# Patient Record
Sex: FEMALE | Race: WHITE | Employment: FULL TIME | URBAN - METROPOLITAN AREA
[De-identification: names, ages, dates, MRNs, and addresses within clinical notes are randomized per-mention and may not be internally consistent; named-entity substitution may affect disease eponyms.]

---

## 2018-07-03 ENCOUNTER — HOSPITAL ENCOUNTER (OUTPATIENT)
Age: 43
Discharge: HOME | End: 2018-07-05
Payer: COMMERCIAL

## 2018-07-03 PROCEDURE — G0123 SCREEN CERV/VAG THIN LAYER: HCPCS

## 2018-07-03 PROCEDURE — 87624 HPV HI-RISK TYP POOLED RSLT: CPT

## 2018-07-12 LAB
CORRESPONDING PAP CASE #: NORMAL
HPV, HIGH RISK: POSITIVE

## 2019-01-17 ENCOUNTER — HOSPITAL ENCOUNTER (OUTPATIENT)
Age: 44
Discharge: HOME | End: 2019-01-19
Payer: COMMERCIAL

## 2019-01-17 PROCEDURE — G0123 SCREEN CERV/VAG THIN LAYER: HCPCS

## 2019-08-06 ENCOUNTER — HOSPITAL ENCOUNTER (OUTPATIENT)
Age: 44
Discharge: HOME | End: 2019-08-08
Payer: COMMERCIAL

## 2019-08-06 PROCEDURE — G0123 SCREEN CERV/VAG THIN LAYER: HCPCS

## 2020-03-23 ENCOUNTER — TELEPHONE (OUTPATIENT)
Dept: PRIMARY CARE CLINIC | Age: 45
End: 2020-03-23

## 2020-03-23 NOTE — TELEPHONE ENCOUNTER
Called patient to advise I would need to cancel her apt tomorrow. She wanted to come in prior to June due to constipation. Spoke with Dr and called patient to advised that she could take Miralax daily to help with this, patient did not answer .

## 2020-06-24 ENCOUNTER — HOSPITAL ENCOUNTER (OUTPATIENT)
Age: 45
Discharge: HOME | End: 2020-06-26
Payer: COMMERCIAL

## 2020-06-24 ENCOUNTER — OFFICE VISIT (OUTPATIENT)
Dept: PRIMARY CARE CLINIC | Age: 45
End: 2020-06-24
Payer: COMMERCIAL

## 2020-06-24 VITALS
HEART RATE: 70 BPM | DIASTOLIC BLOOD PRESSURE: 78 MMHG | SYSTOLIC BLOOD PRESSURE: 112 MMHG | HEIGHT: 68 IN | WEIGHT: 163.2 LBS | TEMPERATURE: 97.8 F | BODY MASS INDEX: 24.74 KG/M2

## 2020-06-24 PROBLEM — K59.04 CHRONIC IDIOPATHIC CONSTIPATION: Status: ACTIVE | Noted: 2020-06-24

## 2020-06-24 PROBLEM — C44.519 BASAL CELL CARCINOMA (BCC) OF BACK: Status: ACTIVE | Noted: 2020-06-24

## 2020-06-24 PROBLEM — R87.619 ABNORMAL PAP SMEAR OF CERVIX: Status: ACTIVE | Noted: 2020-06-24

## 2020-06-24 LAB
ALBUMIN SERPL-MCNC: 4.7 G/DL (ref 3.5–5.2)
ALP BLD-CCNC: 60 U/L (ref 35–104)
ALT SERPL-CCNC: 16 U/L (ref 0–32)
ANION GAP SERPL CALCULATED.3IONS-SCNC: 15 MMOL/L (ref 7–16)
AST SERPL-CCNC: 17 U/L (ref 0–31)
BASOPHILS ABSOLUTE: 0.03 E9/L (ref 0–0.2)
BASOPHILS RELATIVE PERCENT: 0.6 % (ref 0–2)
BILIRUB SERPL-MCNC: 0.4 MG/DL (ref 0–1.2)
BUN BLDV-MCNC: 13 MG/DL (ref 6–20)
CALCIUM SERPL-MCNC: 9.3 MG/DL (ref 8.6–10.2)
CHLORIDE BLD-SCNC: 102 MMOL/L (ref 98–107)
CHOLESTEROL, TOTAL: 172 MG/DL (ref 0–199)
CO2: 23 MMOL/L (ref 22–29)
CREAT SERPL-MCNC: 0.7 MG/DL (ref 0.5–1)
EOSINOPHILS ABSOLUTE: 0.24 E9/L (ref 0.05–0.5)
EOSINOPHILS RELATIVE PERCENT: 4.9 % (ref 0–6)
GFR AFRICAN AMERICAN: >60
GFR NON-AFRICAN AMERICAN: >60 ML/MIN/1.73
GLUCOSE BLD-MCNC: 92 MG/DL (ref 74–99)
HCT VFR BLD CALC: 41.4 % (ref 34–48)
HDLC SERPL-MCNC: 57 MG/DL
HEMOGLOBIN: 13.5 G/DL (ref 11.5–15.5)
IMMATURE GRANULOCYTES #: 0.01 E9/L
IMMATURE GRANULOCYTES %: 0.2 % (ref 0–5)
LDL CHOLESTEROL CALCULATED: 97 MG/DL (ref 0–99)
LYMPHOCYTES ABSOLUTE: 1.06 E9/L (ref 1.5–4)
LYMPHOCYTES RELATIVE PERCENT: 21.5 % (ref 20–42)
MCH RBC QN AUTO: 30.1 PG (ref 26–35)
MCHC RBC AUTO-ENTMCNC: 32.6 % (ref 32–34.5)
MCV RBC AUTO: 92.4 FL (ref 80–99.9)
MONOCYTES ABSOLUTE: 0.47 E9/L (ref 0.1–0.95)
MONOCYTES RELATIVE PERCENT: 9.5 % (ref 2–12)
NEUTROPHILS ABSOLUTE: 3.13 E9/L (ref 1.8–7.3)
NEUTROPHILS RELATIVE PERCENT: 63.3 % (ref 43–80)
PDW BLD-RTO: 11.9 FL (ref 11.5–15)
PLATELET # BLD: 221 E9/L (ref 130–450)
PMV BLD AUTO: 11.1 FL (ref 7–12)
POTASSIUM SERPL-SCNC: 4.3 MMOL/L (ref 3.5–5)
RBC # BLD: 4.48 E12/L (ref 3.5–5.5)
SODIUM BLD-SCNC: 140 MMOL/L (ref 132–146)
TOTAL PROTEIN: 7.2 G/DL (ref 6.4–8.3)
TRIGL SERPL-MCNC: 88 MG/DL (ref 0–149)
TSH SERPL DL<=0.05 MIU/L-ACNC: 1.35 UIU/ML (ref 0.27–4.2)
VLDLC SERPL CALC-MCNC: 18 MG/DL
WBC # BLD: 4.9 E9/L (ref 4.5–11.5)

## 2020-06-24 PROCEDURE — 36415 COLL VENOUS BLD VENIPUNCTURE: CPT

## 2020-06-24 PROCEDURE — 84443 ASSAY THYROID STIM HORMONE: CPT

## 2020-06-24 PROCEDURE — 80053 COMPREHEN METABOLIC PANEL: CPT

## 2020-06-24 PROCEDURE — 85025 COMPLETE CBC W/AUTO DIFF WBC: CPT

## 2020-06-24 PROCEDURE — 99203 OFFICE O/P NEW LOW 30 MIN: CPT | Performed by: FAMILY MEDICINE

## 2020-06-24 PROCEDURE — 86703 HIV-1/HIV-2 1 RESULT ANTBDY: CPT

## 2020-06-24 PROCEDURE — 80061 LIPID PANEL: CPT

## 2020-06-24 RX ORDER — ELECTROLYTES/DEXTROSE
SOLUTION, ORAL ORAL
COMMUNITY

## 2020-06-24 ASSESSMENT — ENCOUNTER SYMPTOMS
VOMITING: 0
SORE THROAT: 0
BACK PAIN: 0
SHORTNESS OF BREATH: 0
DIARRHEA: 0
BLOOD IN STOOL: 0
PHOTOPHOBIA: 0
EYE REDNESS: 0
COUGH: 0
RHINORRHEA: 0
CONSTIPATION: 1
NAUSEA: 0
ABDOMINAL PAIN: 0
WHEEZING: 0

## 2020-06-24 ASSESSMENT — PATIENT HEALTH QUESTIONNAIRE - PHQ9
2. FEELING DOWN, DEPRESSED OR HOPELESS: 0
SUM OF ALL RESPONSES TO PHQ QUESTIONS 1-9: 0
SUM OF ALL RESPONSES TO PHQ9 QUESTIONS 1 & 2: 0
SUM OF ALL RESPONSES TO PHQ QUESTIONS 1-9: 0
1. LITTLE INTEREST OR PLEASURE IN DOING THINGS: 0

## 2020-06-24 NOTE — PROGRESS NOTES
in the near future. HM: UTD except labs. Specialist: Pulmonology, ObGyn, dermatology. Review of Systems   Constitutional: Negative for chills and fever. HENT: Negative for congestion, hearing loss, postnasal drip, rhinorrhea, sneezing, sore throat and tinnitus. Eyes: Negative for photophobia and redness. Respiratory: Negative for cough, shortness of breath and wheezing. Cardiovascular: Negative for chest pain, palpitations and leg swelling. Gastrointestinal: Positive for constipation. Negative for abdominal pain, blood in stool, diarrhea, nausea and vomiting. Endocrine: Negative for polydipsia and polyuria. Genitourinary: Negative for difficulty urinating, dysuria, hematuria and vaginal bleeding. Musculoskeletal: Negative for arthralgias and back pain. Skin: Negative for rash. Neurological: Negative for dizziness, weakness, light-headedness, numbness and headaches. Psychiatric/Behavioral: The patient is not nervous/anxious. Prior to Visit Medications    Medication Sig Taking?  Authorizing Provider   Multiple Vitamins-Minerals (MULTIVITAMIN ADULT) TABS Take by mouth Yes Historical Provider, MD   Multiple Vitamins-Minerals (HAIR SKIN AND NAILS FORMULA) TABS Take by mouth Yes Historical Provider, MD        No Known Allergies    Past Medical History:   Diagnosis Date    Cancer ([facility] [address]     skin, on back    Sarcoidosis     no issues currently        Menstrual History:  OB History        2    Para   2    Term                AB        Living           SAB        TAB        Ectopic        Molar        Multiple        Live Births                     Past Surgical History:   Procedure Laterality Date    BRONCHOSCOPY      to dx Sarcoidosis    CHOLECYSTECTOMY  2012    laparospic    LEEP  2017    SKIN BIOPSY  2017    back    WISDOM TOOTH EXTRACTION         Family History   Problem Relation Age of Onset    Anxiety Disorder Mother     Rheum Arthritis Mother     Osteoporosis Mother     Heart Attack Father 48    High Cholesterol Father        Immunization History   Administered Date(s) Administered    Influenza A ([id number]) Vaccine PF IM 12/17/2009    Influenza Nasal 09/26/2018    Influenza Vaccine, unspecified formulation 10/16/2014, 10/21/2015, 09/23/2016    Influenza Virus Vaccine 10/16/2014, 09/14/2017    Influenza, Quadv, IM, PF (6 mo and older Fluzone, Flulaval, Fluarix, and 3 yrs and older Afluria) 09/25/2019    Pneumococcal Polysaccharide ([id number]) 09/26/2018    Tdap (Boostrix, Adacel) 08/29/2016       Health Maintenance   Topic Date Due    HIV screen  03/17/1990    Lipid screen  03/17/2015    Cervical cancer screen  08/06/2024    DTaP/Tdap/Td vaccine (2 - Td) 08/29/2026    Flu vaccine  Completed    Hepatitis A vaccine  Aged Out    Hepatitis B vaccine  Aged Out    Hib vaccine  Aged Out    Meningococcal (ACWY) vaccine  Aged Out    Pneumococcal 0-64 years Vaccine  Aged Out       Social History     Socioeconomic History    Marital status:      Spouse name: Not on file    Number of children: Not on file    Years of education: Not on file    Highest education level: Not on file   Occupational History    Not on file   Social Needs    Financial resource strain: Not on file    Food insecurity     Worry: Not on file     Inability: Not on file   [employer] needs     Medical: Not on file     Non-medical: Not on file   Tobacco Use    Smoking status: Never Smoker    Smokeless tobacco: Never Used   Substance and Sexual Activity    Alcohol use: Yes     Comment: rare    Drug use: No    Sexual activity: Yes     Partners: Male   Lifestyle    Physical activity     Days per week: Not on file     Minutes per session: Not on file    Stress: Not on file   Relationships    Social connections     Talks on phone: Not on file     Gets together: Not on file     Attends Christianity service: Not on file     Active member of club or There is no distension. Palpations: Abdomen is soft. Tenderness: There is no abdominal tenderness. There is no rebound. Musculoskeletal: Normal range of motion. Right lower leg: No edema. Left lower leg: No edema. Lymphadenopathy:      Cervical: No cervical adenopathy. Skin:     General: Skin is warm and dry. Findings: No rash. Neurological:      Mental Status: She is alert and oriented to person, place, and time. Cranial Nerves: No cranial nerve deficit. Sensory: No sensory deficit. Deep Tendon Reflexes:      Reflex Scores:       Patellar reflexes are 2+ on the right side and 2+ on the left side. Psychiatric:         Behavior: Behavior normal.         Patient Active Problem List    Diagnosis Date Noted    Abnormal Pap smear of cervix 06/24/2020    Basal cell carcinoma (BCC) of back 06/24/2020         ASSESSMENT/PLAN:  Wali Garcia was seen today for new patient. Diagnoses and all orders for this visit:    Family history of hypertrophic cardiomyopathy  -     Henrietta Garcia MD, Cardiology, Steve  Cardiac exam essentially normal today. Will most likely need an EKG and echo per cardiology due to the family history of such. Establishing care with new doctor, encounter for  -     CBC Auto Differential; Future  -     Comprehensive Metabolic Panel; Future  -     Lipid Panel; Future  -     TSH without Reflex; Future    Sarcoidosis  -     CBC Auto Differential; Future  -     Comprehensive Metabolic Panel; Future  -     Lipid Panel; Future  -     TSH without Reflex; Future  Follow pulmonology. Patient is not currently on any medications for such. Does need steroids for respiratory infections. No recent flareups or issues. Screening for hyperlipidemia  -     Lipid Panel; Future    Encounter for screening for HIV  -     HIV Screen; Future    Abnormal cervical Papanicolaou smear, unspecified abnormal pap finding  Patient is following with ObGyn for this issue. Patient reports that she is essentially getting Pap smears every 6 months. Basal cell carcinoma (BCC) of back  Patient is continue to follow with dermatology yearly. Patient denies any no new or recent changes in her skin or lesions. Constipation: Lifestyle and diet modification reviewed. Trial of magnesium and vitamin C along with fiber supplements. Health Maintenance reviewed     Will need the patient's past medical records for review. Return in about 6 weeks (around 8/5/2020). Educational materials and/or home exercises printed for patient's review and were included in patient instructions on his/her After Visit Summary and given to patient at the end of visit.       Counseled regarding above diagnosis, including possible risks and complications,  especially if left uncontrolled.     Counseled regarding the possible side effects, risks, benefits and alternatives to treatment; patient and/or guardianverbalizes understanding, agrees, feels comfortable with and wishes to proceed with above treatment plan.     Advised patient to call with any new medication issues, and read all Rx info from pharmacy to assure aware of all possible risks and side effects of medication before taking.     Reviewed age and gender appropriate health screening exams and vaccinations. Advised patient regarding importance of keeping up withrecommended health maintenance and to schedule as soon as possible if overdue, as this is important in assessing for undiagnosed pathology, especially cancer, as well as protecting against potentially harmful/lifethreatening disease.       Patient and/or guardian verbalizes understanding and agrees with abovecounseling, assessment and plan.     All questions answered. An  electronic signature was used to authenticatethis note.     --Ricardo Grijalva MD on 6/24/20 at 10:13 AM EDT

## 2020-06-25 LAB — HIV-1 AND HIV-2 ANTIBODIES: NORMAL

## 2020-07-07 ENCOUNTER — OFFICE VISIT (OUTPATIENT)
Dept: CARDIOLOGY CLINIC | Age: 45
End: 2020-07-07
Payer: COMMERCIAL

## 2020-07-07 VITALS
HEART RATE: 66 BPM | HEIGHT: 68 IN | SYSTOLIC BLOOD PRESSURE: 136 MMHG | BODY MASS INDEX: 25.01 KG/M2 | DIASTOLIC BLOOD PRESSURE: 80 MMHG | WEIGHT: 165 LBS

## 2020-07-07 PROCEDURE — 99244 OFF/OP CNSLTJ NEW/EST MOD 40: CPT | Performed by: INTERNAL MEDICINE

## 2020-07-07 PROCEDURE — 93000 ELECTROCARDIOGRAM COMPLETE: CPT | Performed by: INTERNAL MEDICINE

## 2020-07-07 NOTE — PROGRESS NOTES
OUTPATIENT CARDIOLOGY CONSULT    Name: Kateryna Gifford    Age: 39 y.o. Date of Service: 7/7/2020    Reason for Consultation: Family history hypertrophic cardiomyopathy    Referring Physician: Giorgio Mancera MD    History of Present Illness:  Kateryna Gifford is a 39 y.o. female who presents today to establish cardiac care, and to be evaluated for HCM. Her 15year old son was recently diagnosed with hypertrophic cardiomyopathy. He is involved in competitive athletics and was noted to have a murmur at a routine physical, subsequent echocardiogram was suggestive of hypertrophic cardiomyopathy and he is scheduled for cardiac MRI later this month. Yudi Gamino tells me there is no prior family history of this. There is no family history of sudden cardiac death. She has never had a syncopal episode. She is always been very active involved in competitive sports through high school. She recalls one time in eighth grade collapsing after 1 mile run in the heat, but this did not happen during exertion. She currently walks daily and prior to the COVID-19 situation working out in the gym couple times a week using treadmill, elliptical, and light weights. She denies any exertional symptoms including dizziness lightheadedness, shortness of breath, chest pain, syncope. She takes no medication. She has no cardiac conditions. She has biopsy-proven pulmonary sarcoidosis diagnosed in 1999, but this is well controlled and is currently on causing no issues. Both her parents are alive. Her father had an MI at the age of 48 and has a pacemaker. Her 66-year-old son was screened with an echocardiogram which she tells me was negative for hypertrophic cardiomyopathy. Review of Systems:  Complete review of systems otherwise negative except as described above.     Past Medical History:  Past Medical History:   Diagnosis Date    Cancer ([address]     skin, on back    Sarcoidosis     no issues currently       Past Surgical History:  Past Surgical History:   Procedure Laterality Date    BRONCHOSCOPY      to dx Sarcoidosis    CHOLECYSTECTOMY  02/03/2012    laparospic    LEEP  06/19/2017    SKIN BIOPSY  02/2017    back    WISDOM TOOTH EXTRACTION         Family History:  Family History   Problem Relation Age of Onset    Anxiety Disorder Mother     Rheum Arthritis Mother     Osteoporosis Mother     Heart Attack Father 48    High Cholesterol Father     Other Son 15        Hypertrophic cardiomyopathy   No family history of sudden cardiac death  Father, alive, age 77, history of MI and pacemaker  Mother alive age 77  Son diagnosed with hypertrophic cardiomyopathy    Social History:  Social History     Tobacco Use    Smoking status: Never Smoker    Smokeless tobacco: Never Used   Substance Use Topics    Alcohol use: Yes     Comment: rare    Drug use: No   She is a single mother  Works as a [employer]    Allergies:  No Known Allergies    Current Medications:    Current Outpatient Medications:     Multiple Vitamins-Minerals (MULTIVITAMIN ADULT) TABS, Take by mouth, Disp: , Rfl:     Multiple Vitamins-Minerals (HAIR SKIN AND NAILS FORMULA) TABS, Take by mouth, Disp: , Rfl:     Physical Exam:  /80   Pulse 66   Ht 5' 8\" (1.727 m)   Wt 165 lb (74.8 kg)   BMI 25.09 kg/m²   Wt Readings from Last 3 Encounters:   07/07/20 165 lb (74.8 kg)   06/24/20 163 lb 3.2 oz (74 kg)   06/19/17 160 lb (72.6 kg)     Appearance: Well-appearing awake, alert, no acute respiratory distress  Skin: Intact, no rash  Eyes: EOMI, no conjunctival erythema  ENMT: Moist mucous membranes. Neck: Supple, no elevated JVP, no carotid bruits  Lungs: Clear to auscultation bilaterally. No wheezes, rales, or rhonchi. Cardiac: Regular rhythm with a normal rate.   S1 & S2 normal, no murmurs  Abdomen: Soft, nontender, +bowel sounds  Extremities: Moves all extremities x 4, no lower extremity edema  Neurologic: No focal motor deficits apparent, normal mood and affect  Peripheral Pulses: Intact posterior tibial pulses bilaterally    Laboratory Tests:  Lab Results   Component Value Date    CREATININE 0.7 06/24/2020    BUN 13 06/24/2020     06/24/2020    K 4.3 06/24/2020     06/24/2020    CO2 23 06/24/2020     No results found for: MG  Lab Results   Component Value Date    WBC 4.9 06/24/2020    HGB 13.5 06/24/2020    HCT 41.4 06/24/2020    MCV 92.4 06/24/2020     06/24/2020     Lab Results   Component Value Date    ALT 16 06/24/2020    AST 17 06/24/2020    ALKPHOS 60 06/24/2020    BILITOT 0.4 06/24/2020     Lab Results   Component Value Date    CKTOTAL 59 01/31/2012    CKMB 0.7 01/31/2012    TROPONINI <0.01 01/31/2012     No results found for: INR, PROTIME  Lab Results   Component Value Date    TSH 1.350 06/24/2020     No results found for: LABA1C  No results found for: EAG  Lab Results   Component Value Date    CHOL 172 06/24/2020     Lab Results   Component Value Date    TRIG 88 06/24/2020     Lab Results   Component Value Date    HDL 57 06/24/2020     Lab Results   Component Value Date    LDLCALC 97 06/24/2020     Lab Results   Component Value Date    LABVLDL 18 06/24/2020     No results found for: CHOLHDLRATIO  No results for input(s): PROBNP in the last 72 hours. Cardiac Tests:  ECG: Sinus rhythm 66 bpm.  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 QRS duration 114 ms. Echocardiogram: Ordered today    Stress test: N/A    Cardiac catheterization: N/A    Orders Placed This Encounter   Procedures    EKG 12 lead    ECHO COMPLETE        Requested Prescriptions      No prescriptions requested or ordered in this encounter        ASSESSMENT / PLAN:  1. Family history hypertrophic cardiomyopathy (16 yo son recently diagnosed -no other family history of HCM)  2. 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 Pulmonary sarcoidosis, biopsy confirmed  4.  Comorbid disease: History of skin cancer (basal cell), chronic constipation, history of cholecystectomy    Recommendations:  Presently asymptomatic from cardiac standpoint. No red flag symptoms or family/personal historical features. EKG is mildly abnormal as noted above. · 2D echocardiogram to assess cardiac morphology  · Will request any details regarding echo findings and any genetic testing performed by her son's cardiologist (Dr. Doris Cotton at [facility])  · Further recommendations pending outcome of echo evaluation  · If echocardiographic evidence of hypertrophic cardiomyopathy, will plan further work-up including Holter monitoring, stress testing, and possibly cardiac MRI. · General cardiac risk factor modification  · Follow-up in 6 months or sooner if the need arises    Thank you for allowing me to participate in your patient's care. Please feel free to contact me if you have any questions or concerns.     Darian Gilman MD  [facility]) Cardiology

## 2020-07-14 ENCOUNTER — TELEPHONE (OUTPATIENT)
Dept: CARDIOLOGY | Age: 45
End: 2020-07-14

## 2020-07-14 NOTE — TELEPHONE ENCOUNTER
SCHEDULED ECHO FOR 07-17-20. REVIEWED COVID CHECKLIST WITH PATIENT.   Electronically signed by Jennifer President on 7/14/2020 at 11:41 AM

## 2020-07-16 ENCOUNTER — HOSPITAL ENCOUNTER (OUTPATIENT)
Age: 45
Discharge: HOME | End: 2020-07-18
Payer: COMMERCIAL

## 2020-07-16 PROCEDURE — 82652 VIT D 1 25-DIHYDROXY: CPT

## 2020-07-16 PROCEDURE — 82164 ANGIOTENSIN I ENZYME TEST: CPT

## 2020-07-17 ENCOUNTER — HOSPITAL ENCOUNTER (OUTPATIENT)
Dept: CARDIOLOGY | Age: 45
Discharge: HOME | End: 2020-07-17
Payer: COMMERCIAL

## 2020-07-17 LAB
LV EF: 63 %
LVEF MODALITY: NORMAL

## 2020-07-17 PROCEDURE — 93306 TTE W/DOPPLER COMPLETE: CPT

## 2020-07-18 LAB — ANGIOTENSIN CONVERTING ENZYME: 11 U/L (ref 9–67)

## 2020-07-19 LAB — VITAMIN D 1,25-DIHYDROXY: 64.6 PG/ML (ref 19.9–79.3)

## 2020-07-20 ENCOUNTER — TELEPHONE (OUTPATIENT)
Dept: CARDIOLOGY CLINIC | Age: 45
End: 2020-07-20

## 2020-08-05 ENCOUNTER — OFFICE VISIT (OUTPATIENT)
Dept: PRIMARY CARE CLINIC | Age: 45
End: 2020-08-05
Payer: COMMERCIAL

## 2020-08-05 VITALS
DIASTOLIC BLOOD PRESSURE: 68 MMHG | RESPIRATION RATE: 16 BRPM | HEART RATE: 78 BPM | HEIGHT: 69 IN | SYSTOLIC BLOOD PRESSURE: 120 MMHG | OXYGEN SATURATION: 98 % | BODY MASS INDEX: 24.41 KG/M2 | WEIGHT: 164.8 LBS | TEMPERATURE: 98.4 F

## 2020-08-05 PROCEDURE — 99213 OFFICE O/P EST LOW 20 MIN: CPT | Performed by: FAMILY MEDICINE

## 2020-08-05 ASSESSMENT — ENCOUNTER SYMPTOMS
DIARRHEA: 0
VOMITING: 0
CONSTIPATION: 0
SHORTNESS OF BREATH: 0
RHINORRHEA: 0
NAUSEA: 0
WHEEZING: 0
ABDOMINAL PAIN: 0
SORE THROAT: 0

## 2020-08-05 NOTE — PROGRESS NOTES
2020     Chief Complaint   Patient presents with    Other     hypertrophic cardiomyopathy        HPI  Pratibha Mcdonnell (:  1975) is a 39 y.o. female, here for evaluation of the following medical concerns:    Patient is a 80-year-old female with a past medical history of sarcoidosis, chronic constipation, basal cell carcinoma back who presents today for follow-up of her establishment office appointment and review of labs. Labs: Vitamin D: 64.6, Ace enzyme 11, CBC within normal limits, CMP within normal limits, lipid panel with a total cholesterol 172, HDL 57, LDL 97, TSH 1.3, HIV negative. Sarcoidosis: Patient does follow pulmonology for this issue. Patient recently saw them personally 1 month ago. No issues were found to be a lab work and imaging. Patient has stable pulmonary sarcoidosis. Patient still CT of her chest pending. Patient had PFTs and echo performed. Echo report shows that her heart is within normal limits. Concerns for HOCM: Echo WNL. Constipation: No issues    HM: WNL    Abnormal PAP: To see OB/GYN in next week or 2. Review of Systems   Constitutional: Negative for chills and fever. HENT: Negative for congestion, rhinorrhea and sore throat. Respiratory: Negative for shortness of breath and wheezing. Cardiovascular: Negative for chest pain and leg swelling. Gastrointestinal: Negative for abdominal pain, constipation, diarrhea, nausea and vomiting. Skin: Negative for rash. Neurological: Negative for light-headedness and headaches. Past Medical History:   Diagnosis Date    Cancer ([facility] [address]     skin, on back    Sarcoidosis     no issues currently       Prior to Visit Medications    Medication Sig Taking?  Authorizing Provider   Multiple Vitamins-Minerals (MULTIVITAMIN ADULT) TABS Take by mouth Yes Historical Provider, MD   Multiple Vitamins-Minerals (HAIR SKIN AND NAILS FORMULA) TABS Take by mouth Yes Historical Provider, MD        No Known Allergies    Social History     Tobacco Use    Smoking status: Never Smoker    Smokeless tobacco: Never Used   Substance Use Topics    Alcohol use: Yes     Comment: rare           Vitals:    08/05/20 0811   BP: 120/68   Pulse: 78   Resp: 16   Temp: 98.4 °F (36.9 °C)   SpO2: 98%   Weight: 164 lb 12.8 oz (74.8 kg)   Height: 5' 8.5\" (1.74 m)     Estimated body mass index is 24.69 kg/m² as calculated from the following:    Height as of this encounter: 5' 8.5\" (1.74 m). Weight as of this encounter: 164 lb 12.8 oz (74.8 kg). Physical Exam  Constitutional:       Appearance: She is well-developed. HENT:      Head: Normocephalic. Eyes:      Conjunctiva/sclera: Conjunctivae normal.      Pupils: Pupils are equal, round, and reactive to light. Cardiovascular:      Rate and Rhythm: Normal rate and regular rhythm. Heart sounds: Normal heart sounds. No murmur. Pulmonary:      Effort: Pulmonary effort is normal.      Breath sounds: Normal breath sounds. No wheezing or rales. Abdominal:      General: Bowel sounds are normal.      Palpations: Abdomen is soft. Tenderness: There is no abdominal tenderness. Musculoskeletal:      Right lower leg: No edema. Left lower leg: No edema. Neurological:      Mental Status: She is alert. Comments: Cranial nerves grossly intact         ASSESSMENT/PLAN:  Ulysses Fries was seen today for other. Diagnoses and all orders for this visit:    Sarcoidosis of lung ([facility] [address]  -     CBC WITH AUTO DIFFERENTIAL; Future  -     COMPREHENSIVE METABOLIC PANEL; Future  Patient is following with pulmonology in regards this issue. Patient has a pending CT scan to perform and recently underwent PFTs. Stable at this time. Abnormal cervical Papanicolaou smear, unspecified abnormal pap finding  Patient will follow up with ObGyn every 3-6 months for repeat Pap smears. Basal cell carcinoma (BCC) of back  Follow dermatology yearly. Chronic idiopathic constipation  Stable.  No issues. HM: UTD    Return in about 6 months (around 2/5/2021) for Routine Follow-up of Chronic Conditions. An electronicsignature was used to authenticate this note.     --Maykel Duncan MD on 8/5/20 at 8:10 AM EDT

## 2020-08-07 ENCOUNTER — HOSPITAL ENCOUNTER (OUTPATIENT)
Age: 45
Discharge: HOME | End: 2020-08-09
Payer: COMMERCIAL

## 2020-08-07 PROCEDURE — G0123 SCREEN CERV/VAG THIN LAYER: HCPCS

## 2021-04-29 ENCOUNTER — OFFICE VISIT (OUTPATIENT)
Dept: PRIMARY CARE CLINIC | Age: 46
End: 2021-04-29
Payer: COMMERCIAL

## 2021-04-29 VITALS
SYSTOLIC BLOOD PRESSURE: 104 MMHG | WEIGHT: 158 LBS | TEMPERATURE: 97.5 F | RESPIRATION RATE: 20 BRPM | DIASTOLIC BLOOD PRESSURE: 70 MMHG | BODY MASS INDEX: 23.4 KG/M2 | HEIGHT: 69 IN | HEART RATE: 69 BPM | OXYGEN SATURATION: 99 %

## 2021-04-29 DIAGNOSIS — H04.203 WATERY EYES: Primary | ICD-10-CM

## 2021-04-29 DIAGNOSIS — J30.1 SEASONAL ALLERGIC RHINITIS DUE TO POLLEN: ICD-10-CM

## 2021-04-29 PROCEDURE — 99213 OFFICE O/P EST LOW 20 MIN: CPT | Performed by: FAMILY MEDICINE

## 2021-04-29 ASSESSMENT — ENCOUNTER SYMPTOMS
RHINORRHEA: 1
SORE THROAT: 0
SHORTNESS OF BREATH: 0
CONSTIPATION: 0
EYE ITCHING: 1
EYE DISCHARGE: 1
VOMITING: 0
DIARRHEA: 0
NAUSEA: 0
WHEEZING: 0
ABDOMINAL PAIN: 0

## 2021-04-29 ASSESSMENT — PATIENT HEALTH QUESTIONNAIRE - PHQ9
1. LITTLE INTEREST OR PLEASURE IN DOING THINGS: 0
2. FEELING DOWN, DEPRESSED OR HOPELESS: 0
SUM OF ALL RESPONSES TO PHQ QUESTIONS 1-9: 0

## 2021-04-29 NOTE — PROGRESS NOTES
History     Tobacco Use    Smoking status: Never Smoker    Smokeless tobacco: Never Used   Substance Use Topics    Alcohol use: Yes     Comment: rare           Vitals:    04/29/21 1614   BP: 104/70   Pulse: 69   Resp: 20   Temp: 97.5 °F (36.4 °C)   TempSrc: Temporal   SpO2: 99%   Weight: 158 lb (71.7 kg)   Height: 5' 8.5\" (1.74 m)     Estimated body mass index is 23.67 kg/m² as calculated from the following:    Height as of this encounter: 5' 8.5\" (1.74 m). Weight as of this encounter: 158 lb (71.7 kg). Physical Exam  Constitutional:       Appearance: She is well-developed. HENT:      Head: Normocephalic. Right Ear: Tympanic membrane normal.      Left Ear: Tympanic membrane normal.   Eyes:      General:         Right eye: No discharge. Left eye: No discharge. Extraocular Movements: Extraocular movements intact. Conjunctiva/sclera: Conjunctivae normal.      Pupils: Pupils are equal, round, and reactive to light. Cardiovascular:      Rate and Rhythm: Normal rate and regular rhythm. Heart sounds: Normal heart sounds. No murmur. Pulmonary:      Effort: Pulmonary effort is normal.      Breath sounds: Normal breath sounds. No wheezing or rales. Skin:     Findings: No rash. Neurological:      Mental Status: She is alert. Comments: Cranial nerves grossly intact         ASSESSMENT/PLAN:  Jey Fitzgerald was seen today for allergies. Diagnoses and all orders for this visit:    Watery eyes  -     External Referral To Allergy  Most likely secondary to the patient's worsening allergies. Patient has not tried a antihistamine topical eyedrop. Patient was advised on using Pataday. Side effects reviewed. Concerns regards this medication reviewed. Seasonal allergic rhinitis due to pollen  -     External Referral To Allergy  Worsening seasonal allergies that have essentially become persistent since November. Patient has tried multiple over-the-counter medications with only mild relief. Patient had significant history of seasonal allergies in the past and has received allergy shots. Patient is not currently following with an allergist. Referral back to allergist. Continue second-generation antihistamine. If patient's symptoms do not improve within the next 1-2 weeks patient will need to see an allergist. If the allergist determines this is not secondary to an allergic nature patient will need to see ophthalmology. All questions and concerns answered and reviewed. Health maintenance: Patient did complete her Coban 19 vaccine series. Return in about 4 months (around 8/29/2021) for Physical.    An [employer] Pharm[employer]signature was used to authenticate this note.     --Alysha Hillman MD on 4/29/21 at 8:50 AM EDT

## 2021-08-26 ENCOUNTER — OFFICE VISIT (OUTPATIENT)
Dept: PRIMARY CARE CLINIC | Age: 46
End: 2021-08-26
Payer: COMMERCIAL

## 2021-08-26 VITALS
DIASTOLIC BLOOD PRESSURE: 64 MMHG | SYSTOLIC BLOOD PRESSURE: 108 MMHG | HEART RATE: 61 BPM | BODY MASS INDEX: 23.52 KG/M2 | WEIGHT: 157 LBS | TEMPERATURE: 98.6 F | OXYGEN SATURATION: 98 %

## 2021-08-26 DIAGNOSIS — R87.619 ABNORMAL CERVICAL PAPANICOLAOU SMEAR, UNSPECIFIED ABNORMAL PAP FINDING: ICD-10-CM

## 2021-08-26 DIAGNOSIS — C44.519 BASAL CELL CARCINOMA (BCC) OF BACK: ICD-10-CM

## 2021-08-26 DIAGNOSIS — D86.0 SARCOIDOSIS OF LUNG (HCC): Primary | ICD-10-CM

## 2021-08-26 DIAGNOSIS — K59.04 CHRONIC IDIOPATHIC CONSTIPATION: ICD-10-CM

## 2021-08-26 DIAGNOSIS — Z13.220 LIPID SCREENING: ICD-10-CM

## 2021-08-26 DIAGNOSIS — J30.1 SEASONAL ALLERGIC RHINITIS DUE TO POLLEN: ICD-10-CM

## 2021-08-26 DIAGNOSIS — Z00.00 ANNUAL PHYSICAL EXAM: ICD-10-CM

## 2021-08-26 PROCEDURE — 99396 PREV VISIT EST AGE 40-64: CPT | Performed by: FAMILY MEDICINE

## 2021-08-26 SDOH — ECONOMIC STABILITY: FOOD INSECURITY: WITHIN THE PAST 12 MONTHS, THE FOOD YOU BOUGHT JUST DIDN'T LAST AND YOU DIDN'T HAVE MONEY TO GET MORE.: NEVER TRUE

## 2021-08-26 SDOH — ECONOMIC STABILITY: FOOD INSECURITY: WITHIN THE PAST 12 MONTHS, YOU WORRIED THAT YOUR FOOD WOULD RUN OUT BEFORE YOU GOT MONEY TO BUY MORE.: NEVER TRUE

## 2021-08-26 ASSESSMENT — ENCOUNTER SYMPTOMS
VOMITING: 0
EYE REDNESS: 0
SORE THROAT: 0
DIARRHEA: 0
BLOOD IN STOOL: 0
BACK PAIN: 0
PHOTOPHOBIA: 0
COUGH: 0
RHINORRHEA: 0
WHEEZING: 0
CONSTIPATION: 0
NAUSEA: 0
ABDOMINAL PAIN: 0
SHORTNESS OF BREATH: 0

## 2021-08-26 ASSESSMENT — SOCIAL DETERMINANTS OF HEALTH (SDOH): HOW HARD IS IT FOR YOU TO PAY FOR THE VERY BASICS LIKE FOOD, HOUSING, MEDICAL CARE, AND HEATING?: NOT HARD AT ALL

## 2021-08-26 NOTE — PROGRESS NOTES
2021    Chief Complaint   Patient presents with    Annual Exam        HPI  Claudeen Fuse (:  1975) is a 55 y.o. female, here for evaluation of the following medical concerns:    Patient is a 51-year-old female with a past medical history of sarcoidosis, chronic constipation, basal cell carcinoma back who presents today for follow-up annual exam.      Previous Labs: Vitamin D: 64.6, Ace enzyme 11, CBC within normal limits, CMP within normal limits, lipid panel with a total cholesterol 172, HDL 57, LDL 97, TSH 1.3, HIV negative.     Sarcoidosis: Patient does follow pulmonology for this issue. Patient has stable pulmonary sarcoidosis. Patient still CT of her chest pending. Patient had PFTs and echo performed. Echo report shows that her heart is within normal limits.     Concerns for HOCM in the past: Echo WNL.    Constipation: No issues    Hx BCC: Follows with derm. No recent lesion. Allergies: Watery eyes. Patient saw her eye doctor. Did not see her allergist. Resovled.      HM: Patient declines Colonoscopy.      Abnormal PAP: To see OB/GYN in next week or 2. Last PAP normal. Yearly PAP       Review of Systems   Constitutional: Negative for chills and fever. HENT: Negative for congestion, hearing loss, postnasal drip, rhinorrhea, sneezing, sore throat and tinnitus. Eyes: Negative for photophobia and redness. Respiratory: Negative for cough, shortness of breath and wheezing. Cardiovascular: Negative for chest pain, palpitations and leg swelling. Gastrointestinal: Negative for abdominal pain, blood in stool, constipation, diarrhea, nausea and vomiting. Endocrine: Negative for polydipsia and polyuria. Genitourinary: Negative for difficulty urinating, dysuria and hematuria. Musculoskeletal: Negative for arthralgias and back pain. Skin: Negative for rash. Neurological: Negative for dizziness, weakness, light-headedness, numbness and headaches.    Psychiatric/Behavioral: The patient is not nervous/anxious. Prior to Visit Medications    Medication Sig Taking?  Authorizing Provider   Multiple Vitamins-Minerals (MULTIVITAMIN ADULT) TABS Take by mouth  Historical Provider, MD   Multiple Vitamins-Minerals (HAIR SKIN AND NAILS FORMULA) TABS Take by mouth  Historical Provider, MD        No Known Allergies    Past Medical History:   Diagnosis Date    Cancer ([address]     skin, on back    Sarcoidosis     no issues currently        Menstrual History:  OB History        2    Para   2    Term                AB        Living           SAB        TAB        Ectopic        Molar        Multiple        Live Births                     Past Surgical History:   Procedure Laterality Date    BRONCHOSCOPY      to dx Sarcoidosis    CHOLECYSTECTOMY  2012    laparospic    LEEP  2017    SKIN BIOPSY  2017    back    WISDOM TOOTH EXTRACTION         Family History   Problem Relation Age of Onset    Anxiety Disorder Mother     Rheum Arthritis Mother     Osteoporosis Mother     Heart Attack Father 48    High Cholesterol Father     Other Son 15        Hypertrophic cardiomyopathy       Immunization History   Administered Date(s) Administered    COVID-19, Moderna, PF, 100mcg/0.5mL 2021, 2021    Influenza A ([id number]) Vaccine PF IM 2009    Influenza Nasal 2018    Influenza Vaccine, unspecified formulation 10/16/2014, 10/21/2015, 2016    Influenza Virus Vaccine 10/16/2014, 2017    Influenza, Quadv, IM, PF (6 mo and older Fluzone, Flulaval, Fluarix, and 3 yrs and older Afluria) 2019    Pneumococcal Polysaccharide ([id number]) 2018    Tdap (Boostrix, Adacel) 2016       Health Maintenance   Topic Date Due    Colon cancer screen colonoscopy  Never done    Flu vaccine (1) 2021    Lipid screen  2025    Cervical cancer screen  2025    DTaP/Tdap/Td vaccine (2 - Td or Tdap) 2026    COVID-19 Temporal   SpO2: 98%   Weight: 157 lb (71.2 kg)       Estimated body mass index is 23.52 kg/m² as calculated from the following:    Height as of 4/29/21: 5' 8.5\" (1.74 m). Weight as of this encounter: 157 lb (71.2 kg). Physical Exam  Vitals and nursing note reviewed. Constitutional:       Appearance: She is well-developed. HENT:      Head: Normocephalic. Right Ear: Tympanic membrane and external ear normal.      Left Ear: Tympanic membrane and external ear normal.   Eyes:      Extraocular Movements: Extraocular movements intact. Conjunctiva/sclera: Conjunctivae normal.      Pupils: Pupils are equal, round, and reactive to light. Neck:      Trachea: Trachea normal.   Cardiovascular:      Rate and Rhythm: Normal rate and regular rhythm. Pulses:           Radial pulses are 2+ on the right side and 2+ on the left side. Posterior tibial pulses are 2+ on the right side and 2+ on the left side. Heart sounds: Normal heart sounds. No murmur heard. Pulmonary:      Effort: Pulmonary effort is normal. No respiratory distress. Breath sounds: Normal breath sounds. No decreased breath sounds, wheezing or rales. Abdominal:      General: Bowel sounds are normal. There is no distension. Palpations: Abdomen is soft. Tenderness: There is no abdominal tenderness. There is no rebound. Musculoskeletal:         General: Normal range of motion. Cervical back: Neck supple. Right lower leg: No edema. Left lower leg: No edema. Skin:     General: Skin is warm and dry. Findings: No rash. Neurological:      Mental Status: She is alert and oriented to person, place, and time. Cranial Nerves: No cranial nerve deficit. Sensory: No sensory deficit.    Psychiatric:         Mood and Affect: Mood normal.         Behavior: Behavior normal.         Patient Active Problem List    Diagnosis Date Noted    Abnormal Pap smear of cervix 06/24/2020    Basal cell carcinoma (BCC) of back 06/24/2020    Chronic idiopathic constipation 06/24/2020    Sarcoidosis of lung ([facility] 12/01/1999         ASSESSMENT/PLAN:  Ai Sanders was seen today for annual exam.    Diagnoses and all orders for this visit:    Sarcoidosis of lung ([facility]  -     TSH without Reflex; Future  -     ANGIOTENSIN CONVERTING ENZYME; Future  -     Vitamin D 25 Hydroxy; Future  -     CT CHEST WO CONTRAST; Future  Patient follow-up with pulmonology in regards to this issue. Patient denies any respiratory symptoms. Labs as noted above. Check CT scan. Abnormal cervical Papanicolaou smear, unspecified abnormal pap finding  Most recent Pap smear was normal.  Currently having annual Pap smears. OB/GYN doctor also order her yearly mammogram.    Basal cell carcinoma (BCC) of back  Follow with dermatology regularly. No recent concerns for skin cancer. Chronic idiopathic constipation  -     Comprehensive Metabolic Panel; Future  -     TSH without Reflex; Future  Stable. No issues. Diet controlled. Annual physical exam  -     CBC Auto Differential; Future  -     Comprehensive Metabolic Panel; Future  -     Lipid Panel; Future  -     TSH without Reflex; Future  -     Vitamin D 25 Hydroxy; Future  Age-appropriate recommendations were reviewed and advised. Patient declined colonoscopy. Lipid screening  -     Lipid Panel; Future    Seasonal allergic rhinitis due to pollen  Stable. No significant issues. Patient using ophthalmologic steroid eyedrop for watery eyes at times.       Health Maintenance reviewed     Return in about 1 year (around 8/26/2022) for Physical.      Educational materials and/or home exercises printed for patient's review and were included in patient instructions on his/her After Visit Summary and given to patient at the end of visit.       Counseled regarding above diagnosis, including possible risks and complications,  especially if left uncontrolled.     Counseled regarding the possible side effects, risks, benefits and alternatives to treatment; patient and/or guardianverbalizes understanding, agrees, feels comfortable with and wishes to proceed with above treatment plan.     Advised patient to call with any new medication issues, and read all Rx info from pharmacy to assure aware of all possible risks and side effects of medication before taking.     Reviewed age and gender appropriate health screening exams and vaccinations. Advised patient regarding importance of keeping up withrecommended health maintenance and to schedule as soon as possible if overdue, as this is important in assessing for undiagnosed pathology, especially cancer, as well as protecting against potentially harmful/lifethreatening disease.       Patient and/or guardian verbalizes understanding and agrees with abovecounseling, assessment and plan.     All questions answered. An  electronic signature was used to authenticatethis note.     --Meri Genao MD on 8/26/21 at 8:11 AM EDT

## 2021-10-01 ENCOUNTER — HOSPITAL ENCOUNTER (OUTPATIENT)
Dept: CT IMAGING | Age: 46
Discharge: HOME | End: 2021-10-03
Payer: COMMERCIAL

## 2021-10-01 DIAGNOSIS — D86.0 SARCOIDOSIS OF LUNG (HCC): ICD-10-CM

## 2021-10-01 PROCEDURE — 71250 CT THORAX DX C-: CPT

## 2021-10-22 DIAGNOSIS — D86.0 SARCOIDOSIS OF LUNG (HCC): ICD-10-CM

## 2021-10-22 LAB — C-REACTIVE PROTEIN: 0.3 MG/DL (ref 0–0.4)

## 2021-10-26 ENCOUNTER — HOSPITAL ENCOUNTER (OUTPATIENT)
Age: 46
Discharge: HOME | End: 2021-10-26
Payer: COMMERCIAL

## 2021-10-26 DIAGNOSIS — D86.0 SARCOIDOSIS OF LUNG (HCC): ICD-10-CM

## 2021-10-26 LAB — SEDIMENTATION RATE, ERYTHROCYTE: 10 MM/HR (ref 0–20)

## 2021-10-26 PROCEDURE — 36415 COLL VENOUS BLD VENIPUNCTURE: CPT

## 2021-10-26 PROCEDURE — 85651 RBC SED RATE NONAUTOMATED: CPT

## 2021-11-04 ENCOUNTER — TELEPHONE (OUTPATIENT)
Dept: CARDIOLOGY | Age: 46
End: 2021-11-04

## 2021-11-04 NOTE — TELEPHONE ENCOUNTER
Rescheduled echo. Patient is to keep OV with Dr. Gomez Wiggins.   Electronically signed by Scott Brewster on 11/4/2021 at 1:24 PM

## 2021-11-05 LAB — MAMMOGRAPHY, EXTERNAL: NEGATIVE

## 2021-11-12 ENCOUNTER — OFFICE VISIT (OUTPATIENT)
Dept: CARDIOLOGY CLINIC | Age: 46
End: 2021-11-12
Payer: COMMERCIAL

## 2021-11-12 VITALS
RESPIRATION RATE: 16 BRPM | OXYGEN SATURATION: 99 % | HEART RATE: 57 BPM | SYSTOLIC BLOOD PRESSURE: 108 MMHG | BODY MASS INDEX: 24.42 KG/M2 | DIASTOLIC BLOOD PRESSURE: 74 MMHG | HEIGHT: 69 IN | WEIGHT: 164.9 LBS

## 2021-11-12 DIAGNOSIS — I51.9 HEART PROBLEM: Primary | ICD-10-CM

## 2021-11-12 PROCEDURE — 99214 OFFICE O/P EST MOD 30 MIN: CPT | Performed by: INTERNAL MEDICINE

## 2021-11-12 PROCEDURE — 93000 ELECTROCARDIOGRAM COMPLETE: CPT | Performed by: INTERNAL MEDICINE

## 2021-11-12 NOTE — PROGRESS NOTES
OUTPATIENT CARDIOLOGY FOLLOW-UP    Name: Nicolle Ayala    Age: 55 y.o. Primary Care Physician: Sin Foote MD    Date of Service: 11/12/2021    Chief Complaint:   Chief Complaint   Patient presents with    Heart Problem     Pt c/o off and on having SOB and sometimes a lightheaded feeling, comes and goes and doesn't last very long        Interim History:   Here for follow-up. Son was diagnosed with hypertrophic cardiomyopathy last year and she presented to my office for evaluation. Her echocardiogram showed no phenotypic changes suggestive of hypertrophic cardiomyopathy. She has history of pulmonary sarcoidosis which has been quiescent. Recent CT chest showed granulomatous disease with no active sarcoid, with a pericardial effusion which appeared new. An echocardiogram was ordered but supposedly yesterday was canceled. ESR and CRP ordered by Dr. Syd Johnson were negative. States overall she been doing okay. Was exercising regularly until the chest CT was done. States she had a head cold a few months ago for a couple of weeks it was predominantly sinus and then upper chest.  In the recent weeks she has noted some shortness of breath going up stairs which is unusual for her. Denies palpitations, chest pain, dizziness or syncope. Her son is following with [facility].     Review of Systems:   Negative except described above    Past Medical History:  Past Medical History:   Diagnosis Date    Cancer ([address]     skin, on back    Sarcoidosis     no issues currently       Past Surgical History:  Past Surgical History:   Procedure Laterality Date    BRONCHOSCOPY      to dx Sarcoidosis    CHOLECYSTECTOMY  02/03/2012    laparospic    LEEP  06/19/2017    SKIN BIOPSY  02/2017    back    WISDOM TOOTH EXTRACTION         Family History:  Family History   Problem Relation Age of Onset    Anxiety Disorder Mother     Rheum Arthritis Mother     Osteoporosis Mother     Heart Attack Father 48    High Cholesterol Father     Other Son 15        Hypertrophic cardiomyopathy       Social History:  Social History     Tobacco Use    Smoking status: Never Smoker    Smokeless tobacco: Never Used   Vaping Use    Vaping Use: Never used   Substance Use Topics    Alcohol use: Yes     Comment: rare    Drug use: No        Allergies:  No Known Allergies    Current Medications:    Current Outpatient Medications:     melatonin 5 MG TABS tablet, Take 5 mg by mouth nightly as needed, Disp: , Rfl:     Multiple Vitamins-Minerals (MULTIVITAMIN ADULT) TABS, Take by mouth, Disp: , Rfl:     Multiple Vitamins-Minerals (HAIR SKIN AND NAILS FORMULA) TABS, Take by mouth, Disp: , Rfl:     Physical Exam:  /74   Pulse 57   Resp 16   Ht 5' 9\" (1.753 m)   Wt 164 lb 14.4 oz (74.8 kg)   SpO2 99%   BMI 24.35 kg/m²   Wt Readings from Last 3 Encounters:   11/12/21 164 lb 14.4 oz (74.8 kg)   10/22/21 161 lb (73 kg)   08/26/21 157 lb (71.2 kg)     Appearance: Well-appearing female, awake, alert and oriented x 3, no acute respiratory distress  Skin: Intact, no rash  Head: Normocephalic, atraumatic  Eyes: EOMI, no conjunctival erythema  ENMT: No pharyngeal erythema, MMM, no rhinorrhea  Neck: Supple, no elevated JVP, no carotid bruits  Lungs: Clear to auscultation bilaterally. No wheezes, rales, or rhonchi.   Cardiac: Regular rate and rhythm, +S1S2, no murmurs apparent  Abdomen: Soft, nontender, +bowel sounds  Extremities: Moves all extremities x 4, no lower extremity edema  Neurologic: No focal motor deficits apparent, normal mood and affect, alert and oriented x 3  Peripheral Pulses: Intact posterior tibial pulses bilaterally    Laboratory Tests:  Lab Results   Component Value Date    CREATININE 1.0 08/26/2021    BUN 13 08/26/2021     08/26/2021    K 4.4 08/26/2021     08/26/2021    CO2 27 08/26/2021     No results found for: MG  Lab Results   Component Value Date    WBC 5.3 08/26/2021    HGB 14.1 2021    HCT 42.4 2021    MCV 90.6 2021     2021     Lab Results   Component Value Date    ALT 15 2021    AST 16 2021    ALKPHOS 54 2021    BILITOT 0.5 2021     Lab Results   Component Value Date    CKTOTAL 59 2012    CKMB 0.7 2012    TROPONINI <0.01 2012     No results found for: INR, PROTIME  Lab Results   Component Value Date    TSH 1.920 2021     No results found for: LABA1C  No results found for: EAG  Lab Results   Component Value Date    CHOL 179 2021    CHOL 172 2020     Lab Results   Component Value Date    TRIG 75 2021    TRIG 88 2020     Lab Results   Component Value Date    HDL 61 2021    HDL 57 2020     Lab Results   Component Value Date    LDLCALC 103 (H) 2021    LDLCALC 97 2020     Lab Results   Component Value Date    LABVLDL 15 2021    LABVLDL 18 2020     No results found for: CHOLHDLRATIO  No results for input(s): PROBNP in the last 72 hours. Cardiac Tests:  EC2021: Sinus bradycardia 57 bpm.  Incomplete right bundle branch block, left anterior fascicular block QRS duration 116 ms.    CT chest 10/1/2021  Impression   1. Small pericardial effusion. 2. Evidence of prior granulomatous infection without acute pulmonary disease. 3. Right nonobstructive nephrolithiasis. Echocardiogram:   Transthoracic echo 2020     Summary   Normal left ventricular size and systolic function. Ejection fraction is visually estimated at 60-65%. Normal diastolic function. No regional wall motion abnormalities seen. Normal left ventricular wall thickness. Normal right ventricular size and function. The left atrium is borderline dilated. No echocardiographic features of hypertrophic cardiomyopathy.     Stress test:      Cardiac catheterization:     Orders Placed This Encounter   Procedures    EKG 12 Lead        Requested Prescriptions      No prescriptions requested or ordered in this encounter        ASSESSMENT / PLAN:  1. Family history hypertrophic cardiomyopathy  2. Pericardial effusion, unclear etiology  3. Dyspnea with exertion  4. 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 Pulmonary sarcoidosis, biopsy confirmed  6. History of basal cell carcinoma  7. Chronic constipation  8. History of cholecystectomy    Recommendations:  Inflammatory markers negative. Unclear etiology of pericardial effusion, possibly residual from a viral infection she had a few months ago. Active inflammation is unlikely given the blood testing. No evidence of acute pericarditis by symptoms or EKG. · Agree with repeat echocardiogram  · Consider subsequent cardiac MRI  · No activity restrictions for now  · Notify me if new symptoms  · Follow-up in 3 months or sooner if need arises    The patient's current medication list, allergies, problem list and results of all previously ordered testing were reviewed at today's visit.     Mikael Floyd MD, [address] [place] [address] Cardiology

## 2021-12-03 ENCOUNTER — HOSPITAL ENCOUNTER (OUTPATIENT)
Dept: CARDIOLOGY | Age: 46
Discharge: HOME | End: 2021-12-03
Payer: COMMERCIAL

## 2021-12-03 DIAGNOSIS — R06.09 DOE (DYSPNEA ON EXERTION): ICD-10-CM

## 2021-12-03 DIAGNOSIS — D86.0 SARCOIDOSIS OF LUNG (HCC): ICD-10-CM

## 2021-12-03 LAB
LV EF: 63 %
LVEF MODALITY: NORMAL

## 2021-12-03 PROCEDURE — 93306 TTE W/DOPPLER COMPLETE: CPT

## 2021-12-29 ENCOUNTER — OFFICE VISIT (OUTPATIENT)
Dept: FAMILY MEDICINE CLINIC | Age: 46
End: 2021-12-29
Payer: COMMERCIAL

## 2021-12-29 VITALS
BODY MASS INDEX: 24.81 KG/M2 | WEIGHT: 168 LBS | DIASTOLIC BLOOD PRESSURE: 62 MMHG | HEART RATE: 83 BPM | TEMPERATURE: 97.3 F | SYSTOLIC BLOOD PRESSURE: 120 MMHG | OXYGEN SATURATION: 97 %

## 2021-12-29 DIAGNOSIS — Z20.822 SUSPECTED COVID-19 VIRUS INFECTION: ICD-10-CM

## 2021-12-29 DIAGNOSIS — U07.1 COVID-19: Primary | ICD-10-CM

## 2021-12-29 DIAGNOSIS — J06.9 ACUTE UPPER RESPIRATORY INFECTION, UNSPECIFIED: ICD-10-CM

## 2021-12-29 DIAGNOSIS — Z20.822 EXPOSURE TO COVID-19 VIRUS: ICD-10-CM

## 2021-12-29 DIAGNOSIS — R52 BODY ACHES: ICD-10-CM

## 2021-12-29 LAB
INFLUENZA A ANTIBODY: NORMAL
INFLUENZA B ANTIBODY: NORMAL
Lab: ABNORMAL
PERFORMING INSTRUMENT: ABNORMAL
QC PASS/FAIL: ABNORMAL
SARS-COV-2, POC: DETECTED

## 2021-12-29 PROCEDURE — 87804 INFLUENZA ASSAY W/OPTIC: CPT | Performed by: PHYSICIAN ASSISTANT

## 2021-12-29 PROCEDURE — 99203 OFFICE O/P NEW LOW 30 MIN: CPT | Performed by: PHYSICIAN ASSISTANT

## 2021-12-29 PROCEDURE — 87426 SARSCOV CORONAVIRUS AG IA: CPT | Performed by: PHYSICIAN ASSISTANT

## 2021-12-29 RX ORDER — BENZONATATE 100 MG/1
100 CAPSULE ORAL 3 TIMES DAILY PRN
Qty: 21 CAPSULE | Refills: 0 | Status: SHIPPED | OUTPATIENT
Start: 2021-12-29 | End: 2022-01-05

## 2021-12-29 RX ORDER — AZITHROMYCIN 250 MG/1
250 TABLET, FILM COATED ORAL SEE ADMIN INSTRUCTIONS
Qty: 6 TABLET | Refills: 0 | Status: SHIPPED | OUTPATIENT
Start: 2021-12-29 | End: 2022-01-03

## 2021-12-29 RX ORDER — PREDNISONE 10 MG/1
TABLET ORAL
Qty: 18 TABLET | Refills: 0 | Status: SHIPPED
Start: 2021-12-29 | End: 2022-09-01

## 2021-12-29 NOTE — PROGRESS NOTES
21  Sindy Guzman : 1975 Sex: female  Age 55 y.o. Subjective:  Chief Complaint   Patient presents with    Congestion    Chest Congestion    Cough    Generalized Body Aches         HPI:   Sindy Guzman , 55 y.o. female presents to [facility] care for evaluation of congestion, cough, myalgias    HPI  51-year-old female presents to [facility] for evaluation of cough, congestion, myalgias. The patient said the symptoms ongoing for the last several days. The patient recently was exposed to son who tested positive for Covid. The patient has had the vaccines. The patient has not had the booster. When she did receive the vaccine she had developed fluid around the heart. The patient ultimately followed up with cardiology and had an echo that showed that it had resolved. The patient states that she is having some increased congestion, drainage. The patient had a rapid test that was negative. They did do a PCR test that was negative. ROS:   Unless otherwise stated in this report the patient's positive and negative responses for review of systems for constitutional, eyes, ENT, cardiovascular, respiratory, gastrointestinal, neurological, , musculoskeletal, and integument systems and related systems to the presenting problem are either stated in the history of present illness or were not pertinent or were negative for the symptoms and/or complaints related to the presenting medical problem. Positives and pertinent negatives as per HPI. All others reviewed and are negative.       PMH:     Past Medical History:   Diagnosis Date    Cancer ([address]     skin, on back    Sarcoidosis     no issues currently       Past Surgical History:   Procedure Laterality Date    BRONCHOSCOPY      to dx Sarcoidosis    CHOLECYSTECTOMY  2012    laparospic    LEEP  2017    SKIN BIOPSY  2017    back    WISDOM TOOTH EXTRACTION         Family History   Problem Relation Age of Onset    Patient referred for open access colonoscopy. Last colonoscopy was 9/25/2007 with Dr Plaza for hx of hyperplastic polyps. Patient Is due for repeat procedure with IV sedation. Transferred to R to schedule with first available DR.    SCREENING CRITERIA  Age: 58 year old Patient meets age criteria.  Personal H/O Colon CA or Polyps: Patient has a personal history of colon polyps (Hyperplastic) on a prior exam.  Family H/O Colon CA: No family history of colon cancer.  GI Symptoms: No    ALLERGIES:   Allergen Reactions   • Wasp Sting SWELLING       Medications:  Anticoagulation (examples - coumadin, heparin, lovenox) : No  Platelet Modifying (examples - Plavix, aspirin, nsaids, Aggrenox) : No  Review of other medications indicate - no concern   BMI: Estimated body mass index is 19.47 kg/m² as calculated from the following:    Height as of 7/28/17: 5' 5\" (1.651 m).    Weight as of 7/28/17: 53.1 kg.  Most recent colon procedure Colonoscopy: 2007 by Bola    Medical Hx:  Diabetes: No, patient is NOT a diabetic  Respiratory: No, patient does not have any lung problems.  Cardiac: No, patient does not have any cardiac problems.  Renal: No  Other Concerns: none noted    SCREENING RECOMMENDATIONS: Patient meets criteria.      Diagnosis code from O/A order:  Hx hyperplastic polyps    IV sedation ok   Anxiety Disorder Mother     Rheum Arthritis Mother     Osteoporosis Mother     Heart Attack Father 48    High Cholesterol Father     Other Son 15        Hypertrophic cardiomyopathy       Medications:     Current Outpatient Medications:     azithromycin (ZITHROMAX) 250 MG tablet, Take 1 tablet by mouth See Admin Instructions for 5 days 500mg on day 1 followed by 250mg on days 2 - 5, Disp: 6 tablet, Rfl: 0    benzonatate (TESSALON) 100 MG capsule, Take 1 capsule by mouth 3 times daily as needed for Cough, Disp: 21 capsule, Rfl: 0    predniSONE (DELTASONE) 10 MG tablet, 3 tabs once daily for 3 days, 2 tabs once daily for 3 days, 1 tab once daily for 3 days, Disp: 18 tablet, Rfl: 0    melatonin 5 MG TABS tablet, Take 5 mg by mouth nightly as needed, Disp: , Rfl:     Multiple Vitamins-Minerals (MULTIVITAMIN ADULT) TABS, Take by mouth, Disp: , Rfl:     Multiple Vitamins-Minerals (HAIR SKIN AND NAILS FORMULA) TABS, Take by mouth, Disp: , Rfl:     Allergies:   No Known Allergies    Social History:     Social History     Tobacco Use    Smoking status: Never Smoker    Smokeless tobacco: Never Used   Vaping Use    Vaping Use: Never used   Substance Use Topics    Alcohol use: Yes     Comment: rare    Drug use: No       Patient lives at home. Physical Exam:     Vitals:    12/29/21 1205   BP: 120/62   Pulse: 83   Temp: 97.3 °F (36.3 °C)   SpO2: 97%   Weight: 168 lb (76.2 kg)       Exam:  Physical Exam  Nurse's notes and vital signs reviewed. The patient is not hypoxic. ? General: Alert, no acute distress, patient resting comfortably Patient is not toxic or lethargic. Skin: Warm, intact, no pallor noted. There is no evidence of rash at this time. Head: Normocephalic, atraumatic  Eye: Normal conjunctiva  Ears, Nose, Throat: Right tympanic membrane clear, left tympanic membrane clear. No drainage or discharge noted. No pre- or post-auricular tenderness, erythema, or swelling noted.    Nasal congestion  Posterior oropharynx shows erythema but no evidence of tonsillar hypertrophy, or exudate. the uvula is midline. No trismus or drooling is noted. Moist mucous membranes. Cardiovascular: Regular Rate and Rhythm  Respiratory: No acute distress, no rhonchi, wheezing or crackles noted. No stridor or retractions are noted. Neurological: A&O x4, normal speech  Psychiatric: Cooperative         Testing:     Results for orders placed or performed in visit on 12/29/21   POCT COVID-19, Antigen   Result Value Ref Range    SARS-COV-2, POC Detected (A) Not Detected    Lot Number [id number]     QC Pass/Fail pass     Performing Instrument BD Veritor    POCT Influenza A/B   Result Value Ref Range    Influenza A Ab neg     Influenza B Ab neg            Medical Decision Making:     Vital signs reviewed    Past medical history reviewed. Allergies reviewed. Medications reviewed. Patient on arrival does not appear to be in any apparent distress or discomfort. The patient has been seen and evaluated. The patient does not appear to be toxic or lethargic. The patient had a rapid Covid test that was detected as positive and influenza was negative. The patient will be treated with a azithromycin, Tessalon, prednisone. Close follow-up with PCP to discuss further symptoms. If worsening signs or symptoms occur the patient is to go directly to the emergency department. COVID-19 was detected as positive. We will have the patient quarantine, isolate per CDC guidelines. Call with any questions or concerns. Return if any of the signs or symptoms change or worsen for further evaluation and possible imaging/chest x-ray. Continue with vitamin regimen, fluids, rest.  Use Motrin, Tylenol. Monitor pulse ox (less than 92% go to ED). Follow-up with PCP or return to [facility] for evaluation.   If symptoms worsen go directly to the ED    The patient was educated on the proper dosage of motrin and tylenol and the appropriate intervals of each. The patient is to increase fluid intake over the next several days. The patient is to use OTC decongestant as needed. The patient is to return to express care or go directly to the emergency department should any of the signs or symptoms worsen. The patient is to followup with primary care physician in 2-3 days for repeat evaluation. The patient has no other questions or concerns at this time the patient will be discharged home. Clinical Impression:   Roxi Gutierrez was seen today for congestion, chest congestion, cough and generalized body aches. Diagnoses and all orders for this visit:    COVID-19    Body aches  -     POCT COVID-19, Antigen  -     POCT Influenza A/B  -     azithromycin (ZITHROMAX) 250 MG tablet; Take 1 tablet by mouth See Admin Instructions for 5 days 500mg on day 1 followed by 250mg on days 2 - 5    Exposure to COVID-19 virus    Suspected COVID-19 virus infection    Acute upper respiratory infection, unspecified    Other orders  -     benzonatate (TESSALON) 100 MG capsule; Take 1 capsule by mouth 3 times daily as needed for Cough  -     predniSONE (DELTASONE) 10 MG tablet; 3 tabs once daily for 3 days, 2 tabs once daily for 3 days, 1 tab once daily for 3 days        The patient is to call for any concerns or return if any of the signs or symptoms worsen. The patient is to follow-up with PCP in the next 2-3 days for repeat evaluation repeat assessment or go directly to the emergency department.      SIGNATURE: Luis Scott III, PA-C

## 2022-01-04 ENCOUNTER — TELEPHONE (OUTPATIENT)
Dept: CARDIOLOGY | Age: 47
End: 2022-01-04

## 2022-01-04 DIAGNOSIS — I31.39 PERICARDIAL EFFUSION: Primary | ICD-10-CM

## 2022-01-04 NOTE — TELEPHONE ENCOUNTER
The reason we did not get back to her is because since Dr. Damaso Wolff ordered the echo, the results went to her after I signed the study and did not come to my in basket. The echo showed normal pumping function, normal wall thickness. There was a very trivial amount of fluid around the backside of the heart. Once she is off COVID-19 isolation we should obtain a cardiac MRI. Please go ahead and order, diagnosis pericardial effusion. I hope she is feeling okay and not having any significant symptoms from the infection.

## 2022-02-23 ENCOUNTER — TELEPHONE (OUTPATIENT)
Dept: PRIMARY CARE CLINIC | Age: 47
End: 2022-02-23

## 2022-02-23 ENCOUNTER — TELEPHONE (OUTPATIENT)
Dept: CARDIOLOGY CLINIC | Age: 47
End: 2022-02-23

## 2022-02-23 DIAGNOSIS — F40.9 PHOBIA, UNSPECIFIED TYPE: Primary | ICD-10-CM

## 2022-02-23 RX ORDER — LORAZEPAM 0.5 MG/1
0.5 TABLET ORAL EVERY 6 HOURS PRN
Qty: 2 TABLET | Refills: 0 | Status: SHIPPED | OUTPATIENT
Start: 2022-02-23 | End: 2022-02-24

## 2022-02-23 NOTE — TELEPHONE ENCOUNTER
Pt calling stating her cardiologist ordered MRI and she is claustrophobic and asking for med to help

## 2022-02-23 NOTE — TELEPHONE ENCOUNTER
Patient scheduled for cardiac MRI on 2/25/22. She states that she is a little claustrophobic and is asking for something to help her relax a little for the procedure. Please advise.

## 2022-02-23 NOTE — TELEPHONE ENCOUNTER
Contacted patient with recommendations per Dr. Germania Pizarro. She verbalized understanding and will contact Dr. Gerri Waddell.

## 2022-02-25 ENCOUNTER — HOSPITAL ENCOUNTER (OUTPATIENT)
Dept: MRI IMAGING | Age: 47
Discharge: HOME | End: 2022-02-27
Payer: COMMERCIAL

## 2022-02-25 DIAGNOSIS — I31.39 PERICARDIAL EFFUSION: ICD-10-CM

## 2022-02-25 LAB
LV EF: 64 %
LVEF MODALITY: NORMAL

## 2022-02-25 PROCEDURE — 75561 CARDIAC MRI FOR MORPH W/DYE: CPT | Performed by: INTERNAL MEDICINE

## 2022-02-25 PROCEDURE — 6360000004 HC RX CONTRAST MEDICATION: Performed by: INTERNAL MEDICINE

## 2022-02-25 PROCEDURE — A9579 GAD-BASE MR CONTRAST NOS,1ML: HCPCS | Performed by: INTERNAL MEDICINE

## 2022-02-25 PROCEDURE — 75561 CARDIAC MRI FOR MORPH W/DYE: CPT

## 2022-02-25 RX ADMIN — GADOTERIDOL 34 ML: 279.3 INJECTION, SOLUTION INTRAVENOUS at 11:08

## 2022-02-28 ENCOUNTER — TELEPHONE (OUTPATIENT)
Dept: CARDIOLOGY CLINIC | Age: 47
End: 2022-02-28

## 2022-02-28 NOTE — RESULT ENCOUNTER NOTE
Cardiac MRI overall showed normal pumping function of the heart, no evidence of hypertrophic cardiomyopathy, normal valves. No evidence of myocarditis or pericarditis. There was still a small amount of pericardial fluid behind the heart, the exact cause of which is not entirely clear at this time but it does not appear to have progressed is not causing any issues at this time. We will continue to monitor it.

## 2022-02-28 NOTE — TELEPHONE ENCOUNTER
Contacted patient with MRI results per Dr. Lucien Stewart. She verbalized understanding.    ----- Message from Amberly Stuart MD sent at 2/28/2022  2:54 PM EST -----  Cardiac MRI overall showed normal pumping function of the heart, no evidence of hypertrophic cardiomyopathy, normal valves. No evidence of myocarditis or pericarditis. There was still a small amount of pericardial fluid behind the heart, the exact cause of which is not entirely clear at this time but it does not appear to have progressed is not causing any issues at this time. We will continue to monitor it.

## 2022-05-23 DIAGNOSIS — Z12.11 COLON CANCER SCREENING: Primary | ICD-10-CM

## 2022-05-24 ENCOUNTER — TELEPHONE (OUTPATIENT)
Dept: SURGERY | Age: 47
End: 2022-05-24

## 2022-05-24 NOTE — TELEPHONE ENCOUNTER
Received a call from the patient who is wanting to schedule the appt with Dr. Mauricio Yepez. Scheduled the patient on 6/9/22 at 4pm in [place] office. Bring ID, medication list and insurance card. Gave the directions to the office. The patient verbalized understanding.   Electronically signed by Keysha Rossi on 5/24/2022 at 4:16 PM

## 2022-06-09 ENCOUNTER — OFFICE VISIT (OUTPATIENT)
Dept: SURGERY | Age: 47
End: 2022-06-09

## 2022-06-09 VITALS
OXYGEN SATURATION: 98 % | HEIGHT: 69 IN | DIASTOLIC BLOOD PRESSURE: 88 MMHG | SYSTOLIC BLOOD PRESSURE: 131 MMHG | TEMPERATURE: 97.2 F | HEART RATE: 74 BPM | RESPIRATION RATE: 18 BRPM | WEIGHT: 167 LBS | BODY MASS INDEX: 24.73 KG/M2

## 2022-06-09 DIAGNOSIS — Z12.11 ENCOUNTER FOR SCREENING COLONOSCOPY: Primary | ICD-10-CM

## 2022-06-09 PROCEDURE — S0285 CNSLT BEFORE SCREEN COLONOSC: HCPCS | Performed by: SURGERY

## 2022-06-09 NOTE — PROGRESS NOTES
[facility] Surgery Clinic Note    Assessment/Plan:      Diagnosis Orders   1. Encounter for screening colonoscopy      Plan for colonoscopy         Return for Colonoscopy. Chief Complaint   Patient presents with    New Patient     colonoscopy       PCP: Cecil Hyman MD    HPI: Talha Kaufman is a 52 y.o. female who presents in consultation for colonoscopy. Her last was 11 years ago. She says they are unsure why she is having abdominal issues then and found it was her gallbladder. This is with Dr. Diana Arreguin. She does complain of constipation issues for several years. Says it will take several days without a bowel movement. It is intermittent. There is no melena or hematochezia. She does endorse lower abdominal discomfort. She is following with GYN they are unsure if it is GYN related. Did not seem to be related to her bowel movements. There are no bowel caliber changes. There is no family history of colon cancer or inflammatory bowel disease. Past Medical History:   Diagnosis Date    Cancer ([address]     skin, on back    Sarcoidosis     no issues currently       Past Surgical History:   Procedure Laterality Date    BRONCHOSCOPY      to dx Sarcoidosis    CHOLECYSTECTOMY  02/03/2012    laparospic    LEEP  06/19/2017    SKIN BIOPSY  02/2017    back    WISDOM TOOTH EXTRACTION         Prior to Admission medications    Medication Sig Start Date End Date Taking?  Authorizing Provider   melatonin 5 MG TABS tablet Take 5 mg by mouth nightly as needed   Yes Historical Provider, MD   Multiple Vitamins-Minerals (HAIR SKIN AND NAILS FORMULA) TABS Take by mouth   Yes Historical Provider, MD   predniSONE (DELTASONE) 10 MG tablet 3 tabs once daily for 3 days, 2 tabs once daily for 3 days, 1 tab once daily for 3 days 12/29/21   LONA Lucio III   Multiple Vitamins-Minerals (MULTIVITAMIN ADULT) TABS Take by mouth    Historical Provider, MD       No Known Allergies    Social History Socioeconomic History    Marital status:      Spouse name: None    Number of children: None    Years of education: None    Highest education level: None   Occupational History    Occupation: office    Tobacco Use    Smoking status: Never Smoker    Smokeless tobacco: Never Used   Vaping Use    Vaping Use: Never used   Substance and Sexual Activity    Alcohol use: Yes     Comment: rare    Drug use: No    Sexual activity: Yes     Partners: Male   Other Topics Concern    None   Social History Narrative    None     Social Determinants of Health     Financial Resource Strain: Low Risk     Difficulty of Paying Living Expenses: Not hard at all   Food Insecurity: No Food Insecurity    Worried About Running Out of Food in the Last Year: Never true    [address] in the Last Year: Never true   Transportation Needs:     Lack of Transportation (Medical): Not on file    Lack of Transportation (Non-Medical):  Not on file   Physical Activity:     Days of Exercise per Week: Not on file    Minutes of Exercise per Session: Not on file   Stress:     Feeling of Stress : Not on file   Social Connections:     Frequency of Communication with Friends and Family: Not on file    Frequency of Social Gatherings with Friends and Family: Not on file    Attends [employer] Services: Not on file    Active Member of [address] [place] [address] or Organizations: Not on file    Attends Club or Organization Meetings: Not on file    Marital Status: Not on file   Intimate Partner Violence:     Fear of Current or Ex-Partner: Not on file    Emotionally Abused: Not on file    Physically Abused: Not on file    Sexually Abused: Not on file   Housing Stability:     Unable to Pay for Housing in the Last Year: Not on file    Number of [place] in the Last Year: Not on file    Unstable Housing in the Last Year: Not on file       Family History   Problem Relation Age of Onset    Anxiety Disorder Mother     Rheum Arthritis Mother    Kar Self Osteoporosis Mother     Heart Attack Father 48    High Cholesterol Father     Other Son 15        Hypertrophic cardiomyopathy       Review of Systems   All other systems reviewed and are negative. Objective:  Vitals:    06/09/22 1556   BP: 131/88   Pulse: 74   Resp: 18   Temp: 97.2 °F (36.2 °C)   TempSrc: Temporal   SpO2: 98%   Weight: 167 lb (75.8 kg)   Height: 5' 9\" (1.753 m)          Physical Exam  Constitutional:       General: She is not in acute distress. Appearance: She is not diaphoretic. Cardiovascular:      Rate and Rhythm: Normal rate. Pulmonary:      Effort: Pulmonary effort is normal. No respiratory distress. Abdominal:      General: There is no distension. Palpations: Abdomen is soft. Tenderness: There is no abdominal tenderness. There is no guarding or rebound. Vanita Mckeon MD      NOTE: This report, in part or full,may have been transcribed using voice recognition software. Every effort was made to ensure accuracy; however, inadvertent computerized transcription errors may be present. Please excuse any transcriptional grammatical or spelling errors that may have escaped my editorial review.     CC: Carolee Kim MD

## 2022-06-13 ENCOUNTER — TELEPHONE (OUTPATIENT)
Dept: SURGERY | Age: 47
End: 2022-06-13

## 2022-06-13 NOTE — TELEPHONE ENCOUNTER
Rena Espinosa is scheduled for colonoscopy with Dr Lin Ybarra on 10-06-22 at [facility] at 10:30 am. Patient was told to arrive at 9:30 am. Patient needs to be NPO after midnight the night before procedure. All surgery instructions were explained to the patient and a surgery letter was also mailed out. MA informed patient that PAT will also be calling to review pre-op instructions and medications. Patient verbalized understanding.   Electronically signed by Re Servin MA on 6/13/2022 at 3:47 PM

## 2022-06-13 NOTE — TELEPHONE ENCOUNTER
Prior Authorization Form:      DEMOGRAPHICS:                     Patient Name:  Kecia Field  Patient :  1975            Insurance:  Payor: Ashok Sanchez / Plan: Ashok Sanchez O [place] LOCAL / Product Type: *No Product type* /   Insurance ID Number:    Payor/Plan Subscr  Sex Relation Sub. Ins. ID Effective Group Num   1.  [address] 1975 Female Self [id number]* 3/16/22 [id number]                                    [id number]         DIAGNOSIS & PROCEDURE:                       Procedure/Operation: Colonoscopy           CPT Code: 10-06-22    Diagnosis:  Screening    ICD10 Code: Z12.11    Location:  [facility]    Surgeon:  Dr Danie Gibbons INFORMATION:                          Date: 10-06-22    Time: 10:30 am              [facility]                                                       Status:  Outpatient        Special Comments:         Electronically signed by Harjeet Gupta MA on 2022 at 3:47 PM

## 2022-09-01 ENCOUNTER — OFFICE VISIT (OUTPATIENT)
Dept: PRIMARY CARE CLINIC | Age: 47
End: 2022-09-01
Payer: COMMERCIAL

## 2022-09-01 VITALS
BODY MASS INDEX: 24.73 KG/M2 | TEMPERATURE: 98.7 F | HEART RATE: 62 BPM | DIASTOLIC BLOOD PRESSURE: 76 MMHG | WEIGHT: 167 LBS | SYSTOLIC BLOOD PRESSURE: 108 MMHG | OXYGEN SATURATION: 99 % | HEIGHT: 69 IN | RESPIRATION RATE: 16 BRPM

## 2022-09-01 DIAGNOSIS — J30.1 SEASONAL ALLERGIC RHINITIS DUE TO POLLEN: ICD-10-CM

## 2022-09-01 DIAGNOSIS — K59.04 CHRONIC IDIOPATHIC CONSTIPATION: ICD-10-CM

## 2022-09-01 DIAGNOSIS — Z00.00 WELL ADULT EXAM: Primary | ICD-10-CM

## 2022-09-01 DIAGNOSIS — Z86.16 HISTORY OF COVID-19: ICD-10-CM

## 2022-09-01 DIAGNOSIS — D86.0 SARCOIDOSIS OF LUNG (HCC): ICD-10-CM

## 2022-09-01 DIAGNOSIS — R87.619 ABNORMAL CERVICAL PAPANICOLAOU SMEAR, UNSPECIFIED ABNORMAL PAP FINDING: ICD-10-CM

## 2022-09-01 DIAGNOSIS — Z13.220 LIPID SCREENING: ICD-10-CM

## 2022-09-01 DIAGNOSIS — E55.9 VITAMIN D DEFICIENCY: ICD-10-CM

## 2022-09-01 LAB
ALBUMIN SERPL-MCNC: 4.2 G/DL (ref 3.5–5.2)
ALP BLD-CCNC: 57 U/L (ref 35–104)
ALT SERPL-CCNC: 13 U/L (ref 0–32)
ANION GAP SERPL CALCULATED.3IONS-SCNC: 12 MMOL/L (ref 7–16)
AST SERPL-CCNC: 16 U/L (ref 0–31)
BASOPHILS ABSOLUTE: 0.03 E9/L (ref 0–0.2)
BASOPHILS RELATIVE PERCENT: 0.5 % (ref 0–2)
BILIRUB SERPL-MCNC: 0.5 MG/DL (ref 0–1.2)
BUN BLDV-MCNC: 15 MG/DL (ref 6–20)
CALCIUM SERPL-MCNC: 9.1 MG/DL (ref 8.6–10.2)
CHLORIDE BLD-SCNC: 106 MMOL/L (ref 98–107)
CHOLESTEROL, TOTAL: 204 MG/DL (ref 0–199)
CO2: 23 MMOL/L (ref 22–29)
CREAT SERPL-MCNC: 0.8 MG/DL (ref 0.5–1)
EOSINOPHILS ABSOLUTE: 0.24 E9/L (ref 0.05–0.5)
EOSINOPHILS RELATIVE PERCENT: 4.2 % (ref 0–6)
GFR AFRICAN AMERICAN: >60
GFR NON-AFRICAN AMERICAN: >60 ML/MIN/1.73
GLUCOSE BLD-MCNC: 89 MG/DL (ref 74–99)
HCT VFR BLD CALC: 40.3 % (ref 34–48)
HDLC SERPL-MCNC: 54 MG/DL
HEMOGLOBIN: 13.5 G/DL (ref 11.5–15.5)
IMMATURE GRANULOCYTES #: 0.03 E9/L
IMMATURE GRANULOCYTES %: 0.5 % (ref 0–5)
LDL CHOLESTEROL CALCULATED: 132 MG/DL (ref 0–99)
LYMPHOCYTES ABSOLUTE: 1.18 E9/L (ref 1.5–4)
LYMPHOCYTES RELATIVE PERCENT: 20.7 % (ref 20–42)
MCH RBC QN AUTO: 31.3 PG (ref 26–35)
MCHC RBC AUTO-ENTMCNC: 33.5 % (ref 32–34.5)
MCV RBC AUTO: 93.3 FL (ref 80–99.9)
MONOCYTES ABSOLUTE: 0.62 E9/L (ref 0.1–0.95)
MONOCYTES RELATIVE PERCENT: 10.9 % (ref 2–12)
NEUTROPHILS ABSOLUTE: 3.61 E9/L (ref 1.8–7.3)
NEUTROPHILS RELATIVE PERCENT: 63.2 % (ref 43–80)
PDW BLD-RTO: 11.5 FL (ref 11.5–15)
PLATELET # BLD: 209 E9/L (ref 130–450)
PMV BLD AUTO: 10.9 FL (ref 7–12)
POTASSIUM SERPL-SCNC: 4.4 MMOL/L (ref 3.5–5)
RBC # BLD: 4.32 E12/L (ref 3.5–5.5)
SODIUM BLD-SCNC: 141 MMOL/L (ref 132–146)
TOTAL PROTEIN: 6.7 G/DL (ref 6.4–8.3)
TRIGL SERPL-MCNC: 89 MG/DL (ref 0–149)
TSH SERPL DL<=0.05 MIU/L-ACNC: 2.07 UIU/ML (ref 0.27–4.2)
VITAMIN D 25-HYDROXY: 49 NG/ML (ref 30–100)
VLDLC SERPL CALC-MCNC: 18 MG/DL
WBC # BLD: 5.7 E9/L (ref 4.5–11.5)

## 2022-09-01 PROCEDURE — 99396 PREV VISIT EST AGE 40-64: CPT | Performed by: FAMILY MEDICINE

## 2022-09-01 ASSESSMENT — ENCOUNTER SYMPTOMS
ABDOMINAL PAIN: 0
WHEEZING: 0
COUGH: 1
BLOOD IN STOOL: 0
BACK PAIN: 0
CONSTIPATION: 1
NAUSEA: 0
EYE REDNESS: 0
VOMITING: 0
PHOTOPHOBIA: 0
RHINORRHEA: 0
DIARRHEA: 0
SHORTNESS OF BREATH: 0
SORE THROAT: 0

## 2022-09-01 ASSESSMENT — PATIENT HEALTH QUESTIONNAIRE - PHQ9
SUM OF ALL RESPONSES TO PHQ QUESTIONS 1-9: 0
SUM OF ALL RESPONSES TO PHQ QUESTIONS 1-9: 0
SUM OF ALL RESPONSES TO PHQ9 QUESTIONS 1 & 2: 0
SUM OF ALL RESPONSES TO PHQ QUESTIONS 1-9: 0
SUM OF ALL RESPONSES TO PHQ QUESTIONS 1-9: 0
2. FEELING DOWN, DEPRESSED OR HOPELESS: 0
1. LITTLE INTEREST OR PLEASURE IN DOING THINGS: 0

## 2022-09-01 NOTE — PROGRESS NOTES
2022    Chief Complaint   Patient presents with    Annual Exam        HPI  Nicky Abreu (:  1975) is a 52 y.o. female, here for evaluation of the following medical concerns:    Patient is a 69-year-old female with a past medical history of sarcoidosis, chronic constipation, basal cell carcinoma back who presents today for follow-up annual exam.     Has had COVID. Due for updated labs     Sarcoidosis: Patient does follow pulmonology for this issue. Patient has stable pulmonary sarcoidosis. Patient still CT of her chest pending. Patient had PFTs and echo performed. Echo report shows that her heart is within normal limits. Concerns for HOCM in the past: Echo WNL. Cardiac MRI WNL with a small amount of rodrick-cardial fluid. Concerns this could be related to COVID infection or vaccination. Constipation: No issues     Hx BCC: Follows with derm. No recent lesion. Allergies: Mild cough when laying. Concerns that it was due to rodrick-cardial fluid. Patient is rodrick-menopausal. Some weight gain over the last. Hot flashes at bed. Hair loss     HM: Patient declines Colonoscopy. Abnormal PAP: To see OB/GYN in next week or 2. Last PAP normal. Yearly PAP    Review of Systems   Constitutional:  Negative for chills and fever. HENT:  Positive for postnasal drip. Negative for congestion, hearing loss, rhinorrhea, sneezing, sore throat and tinnitus. Eyes:  Negative for photophobia and redness. Respiratory:  Positive for cough. Negative for shortness of breath and wheezing. Cardiovascular:  Negative for chest pain, palpitations and leg swelling. Gastrointestinal:  Positive for constipation. Negative for abdominal pain, blood in stool, diarrhea, nausea and vomiting. Endocrine: Negative for polydipsia and polyuria. Genitourinary:  Negative for difficulty urinating, dysuria and hematuria. Musculoskeletal:  Negative for arthralgias and back pain. Skin:  Negative for rash. Neurological:  Negative for dizziness, weakness, light-headedness, numbness and headaches. Psychiatric/Behavioral:  The patient is not nervous/anxious. Prior to Visit Medications    Medication Sig Taking? Authorizing Provider   melatonin 5 MG TABS tablet Take 5 mg by mouth nightly as needed Yes Historical Provider, MD   Multiple Vitamins-Minerals (MULTIVITAMIN ADULT) TABS Take by mouth Yes Historical Provider, MD   Multiple Vitamins-Minerals (HAIR SKIN AND NAILS FORMULA) TABS Take by mouth Yes Historical Provider, MD        No Known Allergies    Past Medical History:   Diagnosis Date    Cancer ([address]     ((Pt Qnr Sub: skin)) skin, on back    Sarcoidosis     no issues currently        Menstrual History:  OB History          2    Para   2    Term                AB        Living             SAB        IAB        Ectopic        Molar        Multiple        Live Births                              Past Surgical History:   Procedure Laterality Date    BRONCHOSCOPY      to dx Sarcoidosis    CHOLECYSTECTOMY  2012    laparospic    EYE SURGERY  ?  Custom PRK    LEEP  2017    SKIN BIOPSY  2017    back    WISDOM TOOTH EXTRACTION         Family History   Problem Relation Age of Onset    Anxiety Disorder Mother     Rheum Arthritis Mother     Osteoporosis Mother     Arthritis Mother         Rheumatoid    Heart Attack Father 48    High Cholesterol Father     Other Son 15        Hypertrophic cardiomyopathy       Immunization History   Administered Date(s) Administered    COVID-19, MODERNA BLUE border, Primary or Immunocompromised, (age 12y+), IM, 100 mcg/0.5mL 2021, 2021    Influenza A ([id number]) Vaccine PF IM 2009    Influenza Nasal 2018    Influenza Vaccine, unspecified formulation 10/16/2014, 10/21/2015, 2016    Influenza Virus Vaccine 10/16/2014, 2017, 10/07/2021    Influenza, FLUARIX, FLULAVAL, FLUZONE (age 10 mo+) AND AFLURIA, (age 1 y+), PF, 0.5mL 09/25/2019    Pneumococcal Polysaccharide ([id number]) 09/26/2018    Tdap (Boostrix, Adacel) 08/29/2016       Health Maintenance   Topic Date Due    Colorectal Cancer Screen  Never done    COVID-19 Vaccine (3 - Booster for Moderna series) 09/21/2021    Depression Screen  04/29/2022    Flu vaccine (1) 09/01/2022    Cervical cancer screen  10/22/2024    Lipids  08/26/2026    DTaP/Tdap/Td vaccine (2 - Td or Tdap) 08/29/2026    HIV screen  Completed    Hepatitis A vaccine  Aged Out    Hepatitis B vaccine  Aged Out    Hib vaccine  Aged Out    Meningococcal (ACWY) vaccine  Aged Out    Pneumococcal 0-64 years Vaccine  Aged Out    Hepatitis C screen  Discontinued       Social History     Socioeconomic History    Marital status:      Spouse name: Not on file    Number of children: Not on file    Years of education: Not on file    Highest education level: Not on file   Occupational History    Occupation: office    Tobacco Use    Smoking status: Never    Smokeless tobacco: Never   Vaping Use    Vaping Use: Never used   Substance and Sexual Activity    Alcohol use:  Yes     Alcohol/week: 4.0 standard drinks     Types: 2 Glasses of wine, 2 Drinks containing 0.5 oz of alcohol per week     Comment: socially    Drug use: No    Sexual activity: Yes     Partners: Male   Other Topics Concern    Not on file   Social History Narrative    Not on file     Social Determinants of Health     Financial Resource Strain: Not on file   Food Insecurity: Not on file   Transportation Needs: Not on file   Physical Activity: Not on file   Stress: Not on file   Social Connections: Not on file   Intimate Partner Violence: Not on file   Housing Stability: Not on file           Vitals:    09/01/22 0810   BP: 108/76   Pulse: 62   Resp: 16   Temp: 98.7 °F (37.1 °C)   TempSrc: Temporal   SpO2: 99%   Weight: 167 lb (75.8 kg)   Height: 5' 9\" (1.753 m)       Estimated body mass index is 24.66 kg/m² as calculated from the following:    Height as of this encounter: 5' 9\" (1.753 m). Weight as of this encounter: 167 lb (75.8 kg). Physical Exam  Vitals and nursing note reviewed. Constitutional:       Appearance: She is well-developed. HENT:      Head: Normocephalic. Right Ear: Tympanic membrane and external ear normal.      Left Ear: Tympanic membrane and external ear normal.   Eyes:      Extraocular Movements: Extraocular movements intact. Conjunctiva/sclera: Conjunctivae normal.      Pupils: Pupils are equal, round, and reactive to light. Neck:      Trachea: Trachea normal.   Cardiovascular:      Rate and Rhythm: Normal rate and regular rhythm. Pulses:           Radial pulses are 2+ on the right side and 2+ on the left side. Posterior tibial pulses are 2+ on the right side and 2+ on the left side. Heart sounds: Normal heart sounds. No murmur heard. Pulmonary:      Effort: Pulmonary effort is normal. No respiratory distress. Breath sounds: Normal breath sounds. No decreased breath sounds, wheezing or rales. Abdominal:      General: Bowel sounds are normal. There is no distension. Palpations: Abdomen is soft. Tenderness: There is no abdominal tenderness. There is no rebound. Musculoskeletal:         General: Normal range of motion. Cervical back: Neck supple. Right lower leg: No edema. Left lower leg: No edema. Skin:     General: Skin is warm and dry. Findings: No rash. Neurological:      Mental Status: She is alert and oriented to person, place, and time. Cranial Nerves: No cranial nerve deficit. Sensory: No sensory deficit. Deep Tendon Reflexes:      Reflex Scores:       Patellar reflexes are 2+ on the right side and 2+ on the left side.   Psychiatric:         Mood and Affect: Mood normal.         Behavior: Behavior normal.       Patient Active Problem List    Diagnosis Date Noted    Abnormal Pap smear of cervix 06/24/2020    Basal cell carcinoma (BCC) of back 06/24/2020 Chronic idiopathic constipation 06/24/2020    Sarcoidosis of lung ([facility] [address] 12/01/1999         ASSESSMENT/PLAN:  Maryann De Dios was seen today for annual exam.    Diagnoses and all orders for this visit:    Well adult exam  Age-appropriate commendations reviewed and advised. Patient will plan on completing a colonoscopy in the next month. Sarcoidosis of lung ([facility] [address]  -     CBC with Auto Differential; Future  -     Comprehensive Metabolic Panel; Future  -     Angiotensin Converting Enzyme; Future  Patient following with cardiology and pulmonology in regards to this issue. Concerns of the patient's pericardial fluid may be secondary to her history of COVID-19 infection or vaccination. Patient reports that her pulmonologist notified her this is unlikely due to her sarcoid. Chronic idiopathic constipation  -     CBC with Auto Differential; Future  -     TSH; Future  Chronic constipation. Patient has tried multiple over-the-counter modalities. Has not tried magnesium. Trial of magnesium oxide 400 to 600 mg nightly. Seasonal allergic rhinitis due to pollen    Vitamin D deficiency  -     Vitamin D 25 Hydroxy; Future    Lipid screening  -     Lipid Panel; Future    Abnormal cervical Papanicolaou smear, unspecified abnormal pap finding  Patient to follow-up with her OB/GYN doctor in regards to this issue. Patient gets yearly Pap smears. Most recent ones have been normal.    Briefly discussed using Lyle Short in regards to her hot flashes. History of COVID-19  Patient has been vaccinated x2 and has had COVID-19 infection since this time. No need for additional boosters or update at this time given such. Concerns also for interaction from vaccine.     Health Maintenance reviewed     Return in about 1 year (around 9/1/2023) for Physical.      Educational materials and/or home exercises printed for patient's review and were included in patient instructions on his/her After Visit Summary and given to patient at the end of visit. Counseled regarding above diagnosis, including possible risks and complications,  especially if left uncontrolled. Counseled regarding the possible side effects, risks, benefits and alternatives to treatment; patient and/or guardianverbalizes understanding, agrees, feels comfortable with and wishes to proceed with above treatment plan. Advised patient to call with any new medication issues, and read all Rx info from pharmacy to assure aware of all possible risks and side effects of medication before taking. Reviewed age and gender appropriate health screening exams and vaccinations. Advised patient regarding importance of keeping up withrecommended health maintenance and to schedule as soon as possible if overdue, as this is important in assessing for undiagnosed pathology, especially cancer, as well as protecting against potentially harmful/lifethreatening disease. Patient and/or guardian verbalizes understanding and agrees with abovecounseling, assessment and plan. All questions answered. An  electronic signature was used to authenticatethis note.     --Ruy Ledezma MD on 9/1/22 at 7:58 AM EDT

## 2022-09-04 LAB — ANGIOTENSIN CONVERTING ENZYME: 13 U/L (ref 16–85)

## 2022-09-28 ENCOUNTER — TELEPHONE (OUTPATIENT)
Dept: SURGERY | Age: 47
End: 2022-09-28

## 2022-09-28 NOTE — TELEPHONE ENCOUNTER
Fozia Elena is scheduled for colonoscopy with Dr Evi Barker on 11-23-22 at [facility] at 11:30 am. Patient was told to arrive at 10:00 am. Patient needs to be NPO after midnight the night before procedure. All surgery instructions were explained to the patient and a surgery letter was also mailed out. MA informed patient that PAT will also be calling to review pre-op instructions and medications. Patient verbalized understanding.   Electronically signed by Jo Mullen MA on 9/28/2022 at 11:03 AM

## 2022-11-21 NOTE — PROGRESS NOTES
[facility] PRE-ADMISSION TESTING INSTRUCTIONS    The Preadmission Testing patient is instructed accordingly using the following criteria (check applicable):    ARRIVAL INSTRUCTIONS:  [x] Parking the day of Surgery is located in the Main Entrance lot. Upon entering the door, make an immediate right to the surgery reception desk    [x] Bring photo ID and insurance card    [] Bring in a copy of Living will or Durable Power of  papers. [x] Please be sure to arrange for responsible adult to provide transportation to and from the hospital    [x] Please arrange for responsible adult to be with you for the 24 hour period post procedure due to having anesthesia    [x] If you awake am of surgery not feeling well or have temperature >100 please call [phone]    GENERAL INSTRUCTIONS:    [x] Nothing by mouth after midnight, including gum, candy, mints or water    [x] You may brush your teeth, but do not swallow any water    [] Take medications as instructed with 1-2 oz of water    [x] Stop herbal supplements and vitamins 5 days prior to procedure    [] Follow preop dosing of blood thinners per physician instructions    [] Take 1/2 dose of evening insulin, but no insulin after midnight    [] No oral diabetic medications after midnight    [] If diabetic and have low blood sugar or feel symptomatic, take 1-2oz apple juice only    [] Bring inhalers day of surgery    [] Bring C-PAP/ Bi-Pap day of surgery    [] Bring urine specimen day of surgery    [x] Shower or bath with soap, lather and rinse well, AM of Surgery, no lotion, powders or creams     [x] Follow bowel prep as instructed per surgeon    [x] No tobacco products within 24 hours of surgery     [x] No alcohol or illegal drug use within 24 hours of surgery.     [x] Jewelry, body piercing's, eyeglasses, contact lenses and dentures are not permitted into surgery (bring cases)      [x] Please do not wear any nail polish, make up or hair products on the day of surgery    [x] You can expect a call the business day prior to procedure to notify you if your arrival time changes    [x] If you receive a survey after surgery we would greatly appreciate your comments    [] Parent/guardian of a minor must accompany their child and remain on the premises  the entire time they are under our care     [] Pediatric patients may bring favorite toy, blanket or comfort item with them    [] A caregiver or family member must remain with the patient during their stay if they are mentally handicapped, have dementia, disoriented or unable to use a call light or would be a safety concern if left unattended    [x] Please notify surgeon if you develop any illness between now and time of surgery (cold, cough, sore throat, fever, nausea, vomiting) or any signs of infections  including skin, wounds, and dental.    [x]  The Outpatient Pharmacy is available to fill your prescription here on your day of surgery, ask your preop nurse for details    [x] Other instructions: wear loose, comfortable clothing    EDUCATIONAL MATERIALS PROVIDED:    [] PAT Preoperative Education Packet/Booklet     [] Medication List    [] Transfusion bracelet applied with instructions    [] Shower with soap, lather and rinse well, and use CHG wipes provided the evening before surgery as instructed    [] Incentive spirometer with instructions

## 2022-11-23 ENCOUNTER — ANESTHESIA EVENT (OUTPATIENT)
Dept: ENDOSCOPY | Age: 47
End: 2022-11-23
Payer: COMMERCIAL

## 2022-11-23 ENCOUNTER — HOSPITAL ENCOUNTER (OUTPATIENT)
Age: 47
Setting detail: OUTPATIENT SURGERY
Discharge: HOME | End: 2022-11-23
Attending: SURGERY | Admitting: SURGERY
Payer: COMMERCIAL

## 2022-11-23 ENCOUNTER — ANESTHESIA (OUTPATIENT)
Dept: ENDOSCOPY | Age: 47
End: 2022-11-23
Payer: COMMERCIAL

## 2022-11-23 VITALS
RESPIRATION RATE: 16 BRPM | TEMPERATURE: 97.8 F | HEIGHT: 69 IN | SYSTOLIC BLOOD PRESSURE: 119 MMHG | BODY MASS INDEX: 25.18 KG/M2 | WEIGHT: 170 LBS | DIASTOLIC BLOOD PRESSURE: 67 MMHG | OXYGEN SATURATION: 99 % | HEART RATE: 63 BPM

## 2022-11-23 DIAGNOSIS — Z12.11 SCREENING FOR MALIGNANT NEOPLASM OF COLON: ICD-10-CM

## 2022-11-23 LAB
HCG, URINE, POC: NEGATIVE
Lab: NORMAL
NEGATIVE QC PASS/FAIL: NORMAL
POSITIVE QC PASS/FAIL: NORMAL

## 2022-11-23 PROCEDURE — 2580000003 HC RX 258: Performed by: NURSE ANESTHETIST, CERTIFIED REGISTERED

## 2022-11-23 PROCEDURE — 88305 TISSUE EXAM BY PATHOLOGIST: CPT

## 2022-11-23 PROCEDURE — 45380 COLONOSCOPY AND BIOPSY: CPT | Performed by: SURGERY

## 2022-11-23 PROCEDURE — 6360000002 HC RX W HCPCS: Performed by: NURSE ANESTHETIST, CERTIFIED REGISTERED

## 2022-11-23 PROCEDURE — 3700000000 HC ANESTHESIA ATTENDED CARE: Performed by: SURGERY

## 2022-11-23 PROCEDURE — 7100000011 HC PHASE II RECOVERY - ADDTL 15 MIN: Performed by: SURGERY

## 2022-11-23 PROCEDURE — 3700000001 HC ADD 15 MINUTES (ANESTHESIA): Performed by: SURGERY

## 2022-11-23 PROCEDURE — 7100000010 HC PHASE II RECOVERY - FIRST 15 MIN: Performed by: SURGERY

## 2022-11-23 PROCEDURE — 2709999900 HC NON-CHARGEABLE SUPPLY: Performed by: SURGERY

## 2022-11-23 PROCEDURE — 3609010300 HC COLONOSCOPY W/BIOPSY SINGLE/MULTIPLE: Performed by: SURGERY

## 2022-11-23 RX ORDER — PROPOFOL 10 MG/ML
INJECTION, EMULSION INTRAVENOUS PRN
Status: DISCONTINUED | OUTPATIENT
Start: 2022-11-23 | End: 2022-11-23 | Stop reason: SDUPTHER

## 2022-11-23 RX ORDER — SODIUM CHLORIDE 9 MG/ML
INJECTION, SOLUTION INTRAVENOUS CONTINUOUS PRN
Status: DISCONTINUED | OUTPATIENT
Start: 2022-11-23 | End: 2022-11-23 | Stop reason: SDUPTHER

## 2022-11-23 RX ADMIN — SODIUM CHLORIDE: 9 INJECTION, SOLUTION INTRAVENOUS at 09:32

## 2022-11-23 RX ADMIN — PROPOFOL 300 MG: 10 INJECTION, EMULSION INTRAVENOUS at 09:45

## 2022-11-23 ASSESSMENT — PAIN - FUNCTIONAL ASSESSMENT: PAIN_FUNCTIONAL_ASSESSMENT: NONE - DENIES PAIN

## 2022-11-23 NOTE — OP NOTE
Colonoscopy Op Note  PATIENT: [facility]    DATE OF PROCEDURE: 11/23/2022    SURGEON: Valeri Valenzuela MD    PREOPERATIVE DIAGNOSIS: Low risk colorectal cancer screening    POSTOPERATIVE DIAGNOSIS: Same, small cecal polyp, extremely tortuous colon    OPERATION: Procedure(s):  COLONOSCOPY WITH BIOPSY    ANESTHESIA: Local monitored anesthesia. ESTIMATED BLOOD LOSS: nil     COMPLICATIONS: None. SPECIMENS:   ID Type Source Tests Collected by Time Destination   A : CECAL POLYP BIOPSY Tissue Colon SURGICAL PATHOLOGY Valeri Valenzuela MD 11/23/2022 0863        HISTORY: The patient is a 52y.o. year old female with history of above preop diagnosis. I recommended colonoscopy with possible biopsy or polypectomy and I explained the risk, benefits, expected outcome, and alternatives to the procedure. Risks included but are not limited to bleeding, infection, respiratory distress, hypotension, and perforation of the colon. The patient understands and is in agreement. PROCEDURE: The patient was given IV conscious sedation per anesthesia. The patient was given supplemental oxygen by nasal cannula. The colonoscope was inserted per rectum and advanced under direct vision to the cecum without difficulty, identified by appendiceal orifice and ileocecal valve. The prep was good so exam was adequate. FINDINGS:    DEBI: normal    Colon: There is a diminutive polyp in the cecum status post forcep polypectomy. Colon extremely tortuous. Rectum/Anus: examined in normal and retroflexed positions - normal    The colon was decompressed and the scope was removed. The withdraw time was approximately 8 minutes. The patient tolerated the procedure well. ASSESSMENT/PLAN:   Follow-up pathology  Colorectal Cancer Screening - recommend repeat colonoscopy in 5 years (may change pending biopsy results). Sooner if issues/concerns.     Valeri Valenzuela MD  11/23/22  10:06 AM

## 2022-11-23 NOTE — H&P
[address] [facility] Surgery Clinic Note     Assessment/Plan:        Diagnosis Orders   1. Encounter for screening colonoscopy        Plan for colonoscopy            Return for Colonoscopy. Chief Complaint   Patient presents with    New Patient       colonoscopy         PCP: Jae Moss MD     HPI: Guanaco Perez is a 52 y.o. female who presents in consultation for colonoscopy. Her last was 11 years ago. She says they are unsure why she is having abdominal issues then and found it was her gallbladder. This is with Dr. Abrahan Sims. She does complain of constipation issues for several years. Says it will take several days without a bowel movement. It is intermittent. There is no melena or hematochezia. She does endorse lower abdominal discomfort. She is following with GYN they are unsure if it is GYN related. Did not seem to be related to her bowel movements. There are no bowel caliber changes. There is no family history of colon cancer or inflammatory bowel disease. Past Medical History        Past Medical History:   Diagnosis Date    Cancer ([address]       skin, on back    Sarcoidosis       no issues currently            Past Surgical History         Past Surgical History:   Procedure Laterality Date    BRONCHOSCOPY         to dx Sarcoidosis    CHOLECYSTECTOMY   02/03/2012     laparospic    LEEP   06/19/2017    SKIN BIOPSY   02/2017     back    [facility] Medications           Prior to Admission medications    Medication Sig Start Date End Date Taking?  Authorizing Provider   melatonin 5 MG TABS tablet Take 5 mg by mouth nightly as needed     Yes Historical Provider, MD   Multiple Vitamins-Minerals (HAIR SKIN AND NAILS FORMULA) TABS Take by mouth     Yes Historical Provider, MD   predniSONE (DELTASONE) 10 MG tablet 3 tabs once daily for 3 days, 2 tabs once daily for 3 days, 1 tab once daily for 3 days 12/29/21     LONA Celis III   Multiple Vitamins-Minerals (MULTIVITAMIN ADULT) TABS Take by mouth       Historical Provider, MD            No Known Allergies     Social History   Social History            Socioeconomic History    Marital status:        Spouse name: None    Number of children: None    Years of education: None    Highest education level: None   Occupational History    Occupation: office    Tobacco Use    Smoking status: Never Smoker    Smokeless tobacco: Never Used   Vaping Use    Vaping Use: Never used   Substance and Sexual Activity    Alcohol use: Yes       Comment: rare    Drug use: No    Sexual activity: Yes       Partners: Male   Other Topics Concern    None   Social History Narrative    None      Social Determinants of Health          Financial Resource Strain: Low Risk     Difficulty of Paying Living Expenses: Not hard at all   Food Insecurity: No Food Insecurity    Worried About [address] in the Last Year: Never true    [address] [employer] in the Last Year: Never true   Transportation Needs:     Lack of Transportation (Medical): Not on file    Lack of Transportation (Non-Medical):  Not on file   Physical Activity:     Days of Exercise per Week: Not on file    Minutes of Exercise per Session: Not on file   Stress:     Feeling of Stress : Not on file   Social Connections:     Frequency of Communication with Friends and Family: Not on file    Frequency of Social Gatherings with Friends and Family: Not on file    Attends Yazdanism Services: Not on file    Active Member of Clubs or Organizations: Not on file    Attends Club or Organization Meetings: Not on file    Marital Status: Not on file   Intimate Partner Violence:     Fear of Current or Ex-Partner: Not on file    Emotionally Abused: Not on file    Physically Abused: Not on file    Sexually Abused: Not on file   Housing Stability:     Unable to Pay for Housing in the Last Year: Not on file    Number of [place] in the Last Year: Not on file    Unstable Housing in the Last Year: Not on file            Family History         Family History   Problem Relation Age of Onset    Anxiety Disorder Mother      Rheum Arthritis Mother      Osteoporosis Mother      Heart Attack Father 48    High Cholesterol Father      Other Son 15         Hypertrophic cardiomyopathy            Review of Systems   All other systems reviewed and are negative. Objective:  Vitals       Vitals:     06/09/22 1556   BP: 131/88   Pulse: 74   Resp: 18   Temp: 97.2 °F (36.2 °C)   TempSrc: Temporal   SpO2: 98%   Weight: 167 lb (75.8 kg)   Height: 5' 9\" (1.753 m)            Physical Exam  Constitutional:       General: She is not in acute distress. Appearance: She is not diaphoretic. Cardiovascular:      Rate and Rhythm: Normal rate. Pulmonary:      Effort: Pulmonary effort is normal. No respiratory distress. Abdominal:      General: There is no distension. Palpations: Abdomen is soft. Tenderness: There is no abdominal tenderness. There is no guarding or rebound. Srini Lew MD        NOTE: This report, in part or full,may have been transcribed using voice recognition software. Every effort was made to ensure accuracy; however, inadvertent computerized transcription errors may be present. Please excuse any transcriptional grammatical or spelling errors that may have escaped my editorial review.      CC: Aye Nicole MD

## 2022-11-23 NOTE — ANESTHESIA PRE PROCEDURE
Department of Anesthesiology  Preprocedure Note       Name:  Svitlana Espinoza   Age:  52 y.o.  :  1975                                          MRN:  [id number]         Date:  2022      Surgeon: Rome Ospina):  Melissa Barba MD    Procedure: Procedure(s):  COLORECTAL CANCER SCREENING, NOT HIGH RISK    Medications prior to admission:   Prior to Admission medications    Medication Sig Start Date End Date Taking? Authorizing Provider   melatonin 5 MG TABS tablet Take 5 mg by mouth nightly as needed    Historical Provider, MD   Multiple Vitamins-Minerals (MULTIVITAMIN ADULT) TABS Take by mouth    Historical Provider, MD   Multiple Vitamins-Minerals (HAIR SKIN AND NAILS FORMULA) TABS Take by mouth    Historical Provider, MD       Current medications:    No current facility-administered medications for this encounter. Allergies:  No Known Allergies    Problem List:    Patient Active Problem List   Diagnosis Code    Abnormal Pap smear of cervix R87.619    Basal cell carcinoma (BCC) of back C44.519    Chronic idiopathic constipation K59.04    Sarcoidosis of lung ([facility] [address] D86.0       Past Medical History:        Diagnosis Date    Cancer ([facility] [address]     ((Pt Qnr Sub: skin)) skin, on back  Basal cell    Encounter for screening colonoscopy     Pericardial effusion     follow with [facility] cardiology  after COVID vaccine    Sarcoidosis     no issues currently. Jose Krystal Follows with Dr. Osman Haney       Past Surgical History:        Procedure Laterality Date    BRONCHOSCOPY      to dx Sarcoidosis    CHOLECYSTECTOMY  2012    laparospic    EYE SURGERY  ? Custom PRK    LEEP  2017    SKIN BIOPSY  2017    back    WISDOM TOOTH EXTRACTION         Social History:    Social History     Tobacco Use    Smoking status: Never    Smokeless tobacco: Never   Substance Use Topics    Alcohol use:  Yes     Alcohol/week: 4.0 standard drinks     Types: 2 Glasses of wine, 2 Drinks containing 0.5 oz of alcohol per week Comment: socially                                Counseling given: Not Answered      Vital Signs (Current):   Vitals:    11/21/22 1237 11/23/22 0914   BP:  122/83   Pulse:  69   Resp:  18   Temp:  97.8 °F (36.6 °C)   TempSrc:  Temporal   SpO2:  98%   Weight: 170 lb (77.1 kg)    Height: 5' 8.5\" (1.74 m)                                               BP Readings from Last 3 Encounters:   11/23/22 122/83   11/17/22 (!) 121/93   09/01/22 108/76       NPO Status: Time of last liquid consumption: 2330                        Time of last solid consumption: 1900                        Date of last liquid consumption: 11/22/22                        Date of last solid food consumption: 11/21/22    BMI:   Wt Readings from Last 3 Encounters:   11/21/22 170 lb (77.1 kg)   11/17/22 164 lb (74.4 kg)   09/01/22 167 lb (75.8 kg)     Body mass index is 25.47 kg/m². CBC:   Lab Results   Component Value Date/Time    WBC 5.7 09/01/2022 08:52 AM    RBC 4.32 09/01/2022 08:52 AM    HGB 13.5 09/01/2022 08:52 AM    HCT 40.3 09/01/2022 08:52 AM    MCV 93.3 09/01/2022 08:52 AM    RDW 11.5 09/01/2022 08:52 AM     09/01/2022 08:52 AM       CMP:   Lab Results   Component Value Date/Time     09/01/2022 08:52 AM    K 4.4 09/01/2022 08:52 AM     09/01/2022 08:52 AM    CO2 23 09/01/2022 08:52 AM    BUN 15 09/01/2022 08:52 AM    CREATININE 0.8 09/01/2022 08:52 AM    GFRAA >60 09/01/2022 08:52 AM    LABGLOM >60 09/01/2022 08:52 AM    GLUCOSE 89 09/01/2022 08:52 AM    GLUCOSE 109 02/04/2012 04:50 AM    PROT 6.7 09/01/2022 08:52 AM    CALCIUM 9.1 09/01/2022 08:52 AM    BILITOT 0.5 09/01/2022 08:52 AM    ALKPHOS 57 09/01/2022 08:52 AM    AST 16 09/01/2022 08:52 AM    ALT 13 09/01/2022 08:52 AM       POC Tests: No results for input(s): POCGLU, POCNA, POCK, POCCL, POCBUN, POCHEMO, POCHCT in the last 72 hours.     Coags: No results found for: PROTIME, INR, APTT    HCG (If Applicable):   Lab Results   Component Value Date    PREGTESTUR NEGATIVE 06/19/2017        ABGs: No results found for: PHART, PO2ART, WSC8ZKY, TGC2WLA, BEART, J8TGASAA     Type & Screen (If Applicable):  Lab Results   Component Value Date    LABABO A 01/31/2012    [facility] POS 01/31/2012       Drug/Infectious Status (If Applicable):  No results found for: HIV, HEPCAB    COVID-19 Screening (If Applicable):   Lab Results   Component Value Date/Time    COVID19 Detected 12/29/2021 12:18 PM           Anesthesia Evaluation  Patient summary reviewed and Nursing notes reviewed no history of anesthetic complications:   Airway: Mallampati: II  TM distance: >3 FB   Neck ROM: full  Mouth opening: > = 3 FB   Dental: normal exam         Pulmonary:Negative Pulmonary ROS and normal exam  breath sounds clear to auscultation                             Cardiovascular:  Exercise tolerance: good (>4 METS),           Rhythm: regular  Rate: normal                    Neuro/Psych:   Negative Neuro/Psych ROS              GI/Hepatic/Renal: Neg GI/Hepatic/Renal ROS            Endo/Other:                      ROS comment: sarcoidosis Abdominal:             Vascular: negative vascular ROS. Other Findings:           Anesthesia Plan      MAC     ASA 2       Induction: intravenous. MIPS: Prophylactic antiemetics administered. Anesthetic plan and risks discussed with patient. Plan discussed with CRNA. Roxi Coffey DO   11/23/2022      Patient seen; chart reviewed and agree with above.     JOSEPH Ryan - CRNA

## 2022-11-23 NOTE — ANESTHESIA POSTPROCEDURE EVALUATION
Department of Anesthesiology  Postprocedure Note    Patient: Mark Anthony Bailey  MRN: [id number]  YOB: 1975  Date of evaluation: 11/23/2022      Procedure Summary     Date: 11/23/22 Room / Location: [facility] / [facility]    Anesthesia Start: 0833 Anesthesia Stop: 1015    Procedure: COLONOSCOPY WITH BIOPSY Diagnosis:       Screening for malignant neoplasm of colon      (Screening for malignant neoplasm of colon [Z12.11])    Surgeons: Trice Kirkland MD Responsible Provider: Verena Ulloa DO    Anesthesia Type: MAC ASA Status: 2          Anesthesia Type: No value filed.     Jana Phase I: Jana Score: 10    Jana Phase II:        Anesthesia Post Evaluation    Patient location during evaluation: bedside  Patient participation: complete - patient participated  Level of consciousness: awake and alert  Airway patency: patent  Nausea & Vomiting: no nausea and no vomiting  Complications: no  Cardiovascular status: blood pressure returned to baseline  Respiratory status: acceptable  Hydration status: euvolemic

## 2022-11-23 NOTE — PROGRESS NOTES
Discharge instructions given to patient and spouse regarding procedure and anesthesia, denies any questions

## 2024-02-02 PROBLEM — I31.39 PERICARDIAL EFFUSION: Status: ACTIVE | Noted: 2021-10-01

## 2024-03-01 ENCOUNTER — HOSPITAL ENCOUNTER (OUTPATIENT)
Dept: CT IMAGING | Age: 49
Discharge: HOME | End: 2024-03-01
Payer: COMMERCIAL

## 2024-03-01 DIAGNOSIS — Z87.01 HISTORY OF RECENT PNEUMONIA: ICD-10-CM

## 2024-03-01 DIAGNOSIS — U07.1 COVID: ICD-10-CM

## 2024-03-01 DIAGNOSIS — D86.0 SARCOIDOSIS OF LUNG (HCC): ICD-10-CM

## 2024-03-01 PROCEDURE — 71250 CT THORAX DX C-: CPT

## 2025-01-01 SDOH — HEALTH STABILITY: PHYSICAL HEALTH: ON AVERAGE, HOW MANY DAYS PER WEEK DO YOU ENGAGE IN MODERATE TO STRENUOUS EXERCISE (LIKE A BRISK WALK)?: 3 DAYS

## 2025-01-01 SDOH — HEALTH STABILITY: PHYSICAL HEALTH: ON AVERAGE, HOW MANY MINUTES DO YOU ENGAGE IN EXERCISE AT THIS LEVEL?: 30 MIN

## 2025-01-02 ENCOUNTER — OFFICE VISIT (OUTPATIENT)
Dept: INTERNAL MEDICINE CLINIC | Age: 50
End: 2025-01-02

## 2025-01-02 VITALS
OXYGEN SATURATION: 99 % | SYSTOLIC BLOOD PRESSURE: 110 MMHG | HEIGHT: 68 IN | BODY MASS INDEX: 26.37 KG/M2 | TEMPERATURE: 99 F | WEIGHT: 174 LBS | HEART RATE: 66 BPM | DIASTOLIC BLOOD PRESSURE: 68 MMHG

## 2025-01-02 DIAGNOSIS — I31.39 IDIOPATHIC PERICARDIAL EFFUSION: ICD-10-CM

## 2025-01-02 DIAGNOSIS — F51.01 PRIMARY INSOMNIA: ICD-10-CM

## 2025-01-02 DIAGNOSIS — D86.0 SARCOIDOSIS OF LUNG (HCC): ICD-10-CM

## 2025-01-02 DIAGNOSIS — E78.00 PURE HYPERCHOLESTEROLEMIA: ICD-10-CM

## 2025-01-02 DIAGNOSIS — F41.1 GENERALIZED ANXIETY DISORDER: ICD-10-CM

## 2025-01-02 DIAGNOSIS — Z76.89 ESTABLISHING CARE WITH NEW DOCTOR, ENCOUNTER FOR: Primary | ICD-10-CM

## 2025-01-02 PROBLEM — C44.519 BASAL CELL CARCINOMA (BCC) OF BACK: Status: RESOLVED | Noted: 2020-06-24 | Resolved: 2025-01-02

## 2025-01-02 SDOH — ECONOMIC STABILITY: FOOD INSECURITY: WITHIN THE PAST 12 MONTHS, THE FOOD YOU BOUGHT JUST DIDN'T LAST AND YOU DIDN'T HAVE MONEY TO GET MORE.: NEVER TRUE

## 2025-01-02 SDOH — SOCIAL STABILITY: SOCIAL INSECURITY
WITHIN THE LAST YEAR, HAVE TO BEEN RAPED OR FORCED TO HAVE ANY KIND OF SEXUAL ACTIVITY BY YOUR PARTNER OR EX-PARTNER?: NO

## 2025-01-02 SDOH — SOCIAL STABILITY: SOCIAL NETWORK
IN A TYPICAL WEEK, HOW MANY TIMES DO YOU TALK ON THE PHONE WITH FAMILY, FRIENDS, OR NEIGHBORS?: MORE THAN THREE TIMES A WEEK

## 2025-01-02 SDOH — SOCIAL STABILITY: SOCIAL INSECURITY: WITHIN THE LAST YEAR, HAVE YOU BEEN AFRAID OF YOUR PARTNER OR EX-PARTNER?: NO

## 2025-01-02 SDOH — SOCIAL STABILITY: SOCIAL INSECURITY
WITHIN THE LAST YEAR, HAVE YOU BEEN KICKED, HIT, SLAPPED, OR OTHERWISE PHYSICALLY HURT BY YOUR PARTNER OR EX-PARTNER?: NO

## 2025-01-02 SDOH — HEALTH STABILITY: PHYSICAL HEALTH: ON AVERAGE, HOW MANY DAYS PER WEEK DO YOU ENGAGE IN MODERATE TO STRENUOUS EXERCISE (LIKE A BRISK WALK)?: 3 DAYS

## 2025-01-02 SDOH — HEALTH STABILITY: PHYSICAL HEALTH: ON AVERAGE, HOW MANY MINUTES DO YOU ENGAGE IN EXERCISE AT THIS LEVEL?: 30 MIN

## 2025-01-02 SDOH — HEALTH STABILITY: MENTAL HEALTH: HOW OFTEN DO YOU HAVE A DRINK CONTAINING ALCOHOL?: MONTHLY OR LESS

## 2025-01-02 SDOH — ECONOMIC STABILITY: INCOME INSECURITY: IN THE LAST 12 MONTHS, WAS THERE A TIME WHEN YOU WERE NOT ABLE TO PAY THE MORTGAGE OR RENT ON TIME?: NO

## 2025-01-02 SDOH — SOCIAL STABILITY: SOCIAL NETWORK: HOW OFTEN DO YOU GET TOGETHER WITH FRIENDS OR RELATIVES?: ONCE A WEEK

## 2025-01-02 SDOH — HEALTH STABILITY: MENTAL HEALTH: HOW MANY STANDARD DRINKS CONTAINING ALCOHOL DO YOU HAVE ON A TYPICAL DAY?: 1 OR 2

## 2025-01-02 SDOH — SOCIAL STABILITY: SOCIAL NETWORK: ARE YOU MARRIED, WIDOWED, DIVORCED, SEPARATED, NEVER MARRIED, OR LIVING WITH A PARTNER?: MARRIED

## 2025-01-02 SDOH — HEALTH STABILITY: MENTAL HEALTH
STRESS IS WHEN SOMEONE FEELS TENSE, NERVOUS, ANXIOUS, OR CAN'T SLEEP AT NIGHT BECAUSE THEIR MIND IS TROUBLED. HOW STRESSED ARE YOU?: RATHER MUCH

## 2025-01-02 SDOH — SOCIAL STABILITY: SOCIAL INSECURITY: WITHIN THE LAST YEAR, HAVE YOU BEEN HUMILIATED OR EMOTIONALLY ABUSED IN OTHER WAYS BY YOUR PARTNER OR EX-PARTNER?: NO

## 2025-01-02 SDOH — SOCIAL STABILITY: SOCIAL NETWORK: HOW OFTEN DO YOU ATTENT MEETINGS OF THE CLUB OR ORGANIZATION YOU BELONG TO?: NEVER

## 2025-01-02 SDOH — SOCIAL STABILITY: SOCIAL NETWORK
DO YOU BELONG TO ANY CLUBS OR ORGANIZATIONS SUCH AS CHURCH GROUPS UNIONS, FRATERNAL OR ATHLETIC GROUPS, OR SCHOOL GROUPS?: NO

## 2025-01-02 SDOH — ECONOMIC STABILITY: FOOD INSECURITY: WITHIN THE PAST 12 MONTHS, YOU WORRIED THAT YOUR FOOD WOULD RUN OUT BEFORE YOU GOT MONEY TO BUY MORE.: NEVER TRUE

## 2025-01-02 SDOH — SOCIAL STABILITY: SOCIAL NETWORK: HOW OFTEN DO YOU ATTEND CHURCH OR RELIGIOUS SERVICES?: MORE THAN 4 TIMES PER YEAR

## 2025-01-02 SDOH — ECONOMIC STABILITY: INCOME INSECURITY: HOW HARD IS IT FOR YOU TO PAY FOR THE VERY BASICS LIKE FOOD, HOUSING, MEDICAL CARE, AND HEATING?: NOT HARD AT ALL

## 2025-01-02 SDOH — ECONOMIC STABILITY: TRANSPORTATION INSECURITY
IN THE PAST 12 MONTHS, HAS THE LACK OF TRANSPORTATION KEPT YOU FROM MEDICAL APPOINTMENTS OR FROM GETTING MEDICATIONS?: NO

## 2025-01-02 ASSESSMENT — PATIENT HEALTH QUESTIONNAIRE - PHQ9
SUM OF ALL RESPONSES TO PHQ QUESTIONS 1-9: 0
SUM OF ALL RESPONSES TO PHQ9 QUESTIONS 1 & 2: 0
1. LITTLE INTEREST OR PLEASURE IN DOING THINGS: NOT AT ALL
2. FEELING DOWN, DEPRESSED OR HOPELESS: NOT AT ALL
SUM OF ALL RESPONSES TO PHQ QUESTIONS 1-9: 0

## 2025-01-02 ASSESSMENT — ENCOUNTER SYMPTOMS
CHOKING: 0
SHORTNESS OF BREATH: 0
WHEEZING: 0
BACK PAIN: 0
COUGH: 0
CONSTIPATION: 0
ABDOMINAL PAIN: 0
CHEST TIGHTNESS: 0
NAUSEA: 0
VOMITING: 0
DIARRHEA: 0

## 2025-01-02 NOTE — PROGRESS NOTES
2 sprays by Nasal route at bedtime      melatonin 5 MG TABS tablet Take 1 tablet by mouth nightly as needed      Multiple Vitamins-Minerals (MULTIVITAMIN ADULT) TABS Take by mouth      Multiple Vitamins-Minerals (HAIR SKIN AND NAILS FORMULA) TABS Take by mouth       No current facility-administered medications for this visit.       Physical Exam  Head is normocephalic and atraumatic.  Neck is supple. No adenopathy.  Lungs are clear to auscultation bilaterally.  Heart has a regular rate and rhythm without murmurs, rubs, or gallops.  Abdomen is soft, nontender, nondistended. Good bowel sounds throughout. No masses, no organomegaly, no bruit.  No clubbing, cyanosis, or edema noted bilaterally in the extremities.  Skin has no rash or wounds. Normal texture.  Neurologically intact. No focal deficits.    Vital Signs  Blood pressure is 110/68.       Health Maintenance Due   Topic Date Due    Depression Screen  Never done    Hepatitis B vaccine (1 of 3 - 19+ 3-dose series) Never done    Breast cancer screen  11/05/2023    Flu vaccine (1) 08/01/2024    COVID-19 Vaccine (3 - 2023-24 season) 09/01/2024    Cervical cancer screen  10/22/2024     Last colonoscopy Date of last Colonoscopy: 11/23/2022   No cologuard on file  No FIT/FOBT on file   No flexible sigmoidoscopy on file   Last mammogram Date of last Mammogram: 11/5/2021   Last dexa bone scan none found.  Date of last Cervical Cancer screen (HPV or PAP): 10/22/2021   Last PSA       Past Medical History:   Diagnosis Date    Basal cell carcinoma (BCC) of back 06/24/2020    Cancer (HCC)     ((Pt Qnr Sub: skin)) skin, on back  Basal cell    Encounter for screening colonoscopy     Pericardial effusion     follow with [facility] cardiology  after COVID vaccine    Sarcoidosis     no issues currently.. Follows with Dr. ANTIONE Martin        Past Surgical History:   Procedure Laterality Date    BRONCHOSCOPY      to dx Sarcoidosis    CHOLECYSTECTOMY  02/03/2012    laparospic    COLONOSCOPY N/A

## 2025-01-16 ENCOUNTER — NURSE ONLY (OUTPATIENT)
Dept: INTERNAL MEDICINE CLINIC | Age: 50
End: 2025-01-16
Payer: COMMERCIAL

## 2025-01-16 DIAGNOSIS — E78.00 PURE HYPERCHOLESTEROLEMIA: ICD-10-CM

## 2025-01-16 DIAGNOSIS — D86.0 SARCOIDOSIS OF LUNG (HCC): ICD-10-CM

## 2025-01-16 DIAGNOSIS — Z00.00 BLOOD TESTS FOR ROUTINE GENERAL PHYSICAL EXAMINATION: Primary | ICD-10-CM

## 2025-01-16 DIAGNOSIS — E55.9 VITAMIN D DEFICIENCY: ICD-10-CM

## 2025-01-16 DIAGNOSIS — F41.1 GENERALIZED ANXIETY DISORDER: ICD-10-CM

## 2025-01-16 PROCEDURE — 36415 COLL VENOUS BLD VENIPUNCTURE: CPT | Performed by: NEUROMUSCULOSKELETAL MEDICINE & OMM

## 2025-01-17 LAB
ALBUMIN: 4.4 G/DL (ref 3.5–5.2)
ALP BLD-CCNC: 65 U/L (ref 35–104)
ALT SERPL-CCNC: 17 U/L (ref 0–32)
ANION GAP SERPL CALCULATED.3IONS-SCNC: 16 MMOL/L (ref 7–16)
AST SERPL-CCNC: 21 U/L (ref 0–31)
BASOPHILS ABSOLUTE: 0.03 K/UL (ref 0–0.2)
BASOPHILS RELATIVE PERCENT: 1 % (ref 0–2)
BILIRUB SERPL-MCNC: 0.4 MG/DL (ref 0–1.2)
BUN BLDV-MCNC: 15 MG/DL (ref 6–20)
CALCIUM SERPL-MCNC: 9.8 MG/DL (ref 8.6–10.2)
CHLORIDE BLD-SCNC: 101 MMOL/L (ref 98–107)
CHOLESTEROL, TOTAL: 230 MG/DL
CO2: 20 MMOL/L (ref 22–29)
CREAT SERPL-MCNC: 0.8 MG/DL (ref 0.5–1)
EOSINOPHILS ABSOLUTE: 0.21 K/UL (ref 0.05–0.5)
EOSINOPHILS RELATIVE PERCENT: 4 % (ref 0–6)
GFR, ESTIMATED: >90 ML/MIN/1.73M2
GLUCOSE BLD-MCNC: 82 MG/DL (ref 74–99)
HCT VFR BLD CALC: 44.3 % (ref 34–48)
HDLC SERPL-MCNC: 54 MG/DL
HEMOGLOBIN: 14.5 G/DL (ref 11.5–15.5)
IMMATURE GRANULOCYTES %: 0 % (ref 0–5)
IMMATURE GRANULOCYTES ABSOLUTE: <0.03 K/UL (ref 0–0.58)
LDL CHOLESTEROL: 159 MG/DL
LYMPHOCYTES ABSOLUTE: 1.33 K/UL (ref 1.5–4)
LYMPHOCYTES RELATIVE PERCENT: 24 % (ref 20–42)
MCH RBC QN AUTO: 30.5 PG (ref 26–35)
MCHC RBC AUTO-ENTMCNC: 32.7 G/DL (ref 32–34.5)
MCV RBC AUTO: 93.1 FL (ref 80–99.9)
MONOCYTES ABSOLUTE: 0.48 K/UL (ref 0.1–0.95)
MONOCYTES RELATIVE PERCENT: 9 % (ref 2–12)
NEUTROPHILS ABSOLUTE: 3.44 K/UL (ref 1.8–7.3)
NEUTROPHILS RELATIVE PERCENT: 63 % (ref 43–80)
PDW BLD-RTO: 11.9 % (ref 11.5–15)
PLATELET # BLD: 241 K/UL (ref 130–450)
PMV BLD AUTO: 11.1 FL (ref 7–12)
POTASSIUM SERPL-SCNC: 4.9 MMOL/L (ref 3.5–5)
RBC # BLD: 4.76 M/UL (ref 3.5–5.5)
SODIUM BLD-SCNC: 137 MMOL/L (ref 132–146)
TOTAL PROTEIN: 7.3 G/DL (ref 6.4–8.3)
TRIGL SERPL-MCNC: 87 MG/DL
TSH SERPL DL<=0.05 MIU/L-ACNC: 1.76 UIU/ML (ref 0.27–4.2)
VITAMIN D 25-HYDROXY: 42.5 NG/ML (ref 30–100)
VLDLC SERPL CALC-MCNC: 17 MG/DL
WBC # BLD: 5.5 K/UL (ref 4.5–11.5)

## 2025-01-22 ENCOUNTER — TELEPHONE (OUTPATIENT)
Dept: INTERNAL MEDICINE CLINIC | Age: 50
End: 2025-01-22

## 2025-01-22 DIAGNOSIS — E78.00 PURE HYPERCHOLESTEROLEMIA: Primary | ICD-10-CM

## 2025-01-22 RX ORDER — ROSUVASTATIN CALCIUM 10 MG/1
10 TABLET, COATED ORAL NIGHTLY
Qty: 30 TABLET | Refills: 3 | Status: SHIPPED | OUTPATIENT
Start: 2025-01-22

## 2025-01-22 NOTE — TELEPHONE ENCOUNTER
Patient agrees to start crestor 10mg    Patient would like it to be sent to     [facility] [address]

## 2025-01-23 ENCOUNTER — PATIENT MESSAGE (OUTPATIENT)
Dept: INTERNAL MEDICINE CLINIC | Age: 50
End: 2025-01-23

## 2025-01-23 NOTE — TELEPHONE ENCOUNTER
Spoke with patient.  Her questions were answered and she will be picking up her Rx at the pharmacy.

## 2025-02-06 ENCOUNTER — TELEPHONE (OUTPATIENT)
Dept: INTERNAL MEDICINE CLINIC | Age: 50
End: 2025-02-06

## 2025-02-06 NOTE — TELEPHONE ENCOUNTER
Pt called c/o rosuvastatin causing severe mid back down to bottom of feet pain. Was up all night last night. What can she do to help alleviate this? She can come in today if you need her to

## 2025-02-11 ENCOUNTER — OFFICE VISIT (OUTPATIENT)
Dept: PULMONOLOGY | Age: 50
End: 2025-02-11
Payer: COMMERCIAL

## 2025-02-11 VITALS
HEIGHT: 68 IN | HEART RATE: 65 BPM | BODY MASS INDEX: 26.52 KG/M2 | SYSTOLIC BLOOD PRESSURE: 119 MMHG | WEIGHT: 175 LBS | OXYGEN SATURATION: 100 % | TEMPERATURE: 97 F | DIASTOLIC BLOOD PRESSURE: 78 MMHG | RESPIRATION RATE: 14 BRPM

## 2025-02-11 DIAGNOSIS — D86.0 SARCOIDOSIS OF LUNG: Primary | ICD-10-CM

## 2025-02-11 PROCEDURE — 99204 OFFICE O/P NEW MOD 45 MIN: CPT | Performed by: INTERNAL MEDICINE

## 2025-02-20 ENCOUNTER — TELEPHONE (OUTPATIENT)
Dept: PULMONOLOGY | Age: 50
End: 2025-02-20

## 2025-02-20 NOTE — TELEPHONE ENCOUNTER
Letter sent in the mail to patient to inform of appointment change with Dr. Dial due to ICU schedule changes.

## 2025-02-21 NOTE — PROGRESS NOTES
[facility]     HISTORY OF PRESENT ILLNESS:    Angie Meyers is a 49 y.o. year old female here for evaluation of sarcoidosis.  She is here to reestablish care with pulmonary.  She states she was originally diagnosed with sarcoidosis in 1999 based upon a bronchoscopy.  She states that she has had issues with having frequent pneumonias since she was a child.  She previously saw  and then .  States that she was diagnosed nose with severe pneumonia and hospitalized at [facility] somewhere in the early 2000 and had been on high doses of steroids then was weaned off around 2004.  She states she has mostly been in remission since but then she when she is ill she usually ends up on steroids for quite a while.  She also complains of myalgias and states that these started after she was started on Crestor.  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  She is a never smoker      ALLERGIES:  No Known Allergies    PAST MEDICAL HISTORY:       Diagnosis Date    Basal cell carcinoma (BCC) of back 06/24/2020    Cancer (HCC)     ((Pt Qnr Sub: skin)) skin, on back  Basal cell    Encounter for screening colonoscopy     Pericardial effusion     follow with [facility] cardiology  after COVID vaccine    Sarcoidosis     no issues currently.. Follows with Dr. ANTIONE Martin       MEDICATIONS:   Current Outpatient Medications   Medication Sig Dispense Refill    rosuvastatin (CRESTOR) 10 MG tablet Take 1 tablet by mouth nightly 30 tablet 3    fluticasone (FLONASE) 50 MCG/ACT nasal spray 2 sprays by Nasal route at bedtime      melatonin 5 MG TABS tablet Take 1 tablet by mouth nightly as needed      Multiple Vitamins-Minerals (MULTIVITAMIN ADULT) TABS Take by mouth      Multiple Vitamins-Minerals (HAIR SKIN AND NAILS FORMULA) TABS Take by mouth       No current facility-administered medications for this visit.       SOCIAL AND OCCUPATIONAL

## (undated) DEVICE — FORCEPS BX L240CM JAW DIA2.4MM ORNG L CAP W/ NDL DISP RAD

## (undated) DEVICE — GRADUATE TRIANG MEASURE 1000ML BLK PRNT

## (undated) DEVICE — SPONGE GZ W4XL4IN RAYON POLY FILL CVR W/ NONWOVEN FAB